# Patient Record
Sex: MALE | Race: WHITE | NOT HISPANIC OR LATINO | Employment: FULL TIME | URBAN - METROPOLITAN AREA
[De-identification: names, ages, dates, MRNs, and addresses within clinical notes are randomized per-mention and may not be internally consistent; named-entity substitution may affect disease eponyms.]

---

## 2018-08-01 ENCOUNTER — TELEPHONE (OUTPATIENT)
Dept: NEUROLOGY | Facility: CLINIC | Age: 28
End: 2018-08-01

## 2018-08-01 NOTE — TELEPHONE ENCOUNTER
----- Message from Hector Knott sent at 8/1/2018 11:26 AM CDT -----  Contact: Pt  .Type:  Patient Returning Call    Who Called: Gunjan  Who Left Message for Patient: Lance  Does the patient know what this is regarding?: patient isn't sure  Best Call Back Number: [phone] (home)   Additional Information:

## 2019-10-03 PROBLEM — R03.0 ELEVATED BP WITHOUT DIAGNOSIS OF HYPERTENSION: Status: ACTIVE | Noted: 2019-10-03

## 2019-10-03 PROBLEM — R19.7 DIARRHEA: Status: ACTIVE | Noted: 2019-10-03

## 2020-07-27 ENCOUNTER — TELEPHONE (OUTPATIENT)
Dept: NEUROLOGY | Facility: CLINIC | Age: 30
End: 2020-07-27

## 2020-07-27 NOTE — TELEPHONE ENCOUNTER
----- Message from Jonas Cortez sent at 7/27/2020  2:30 PM CDT -----  Type: Needs Medical Advice    Who Called:  Velma Butler (Mother)  Best Call Back Number: [phone]  Additional Information: Caller would like to discuss scheduling new patient appointment from referral. Please call to advise. Thanks!

## 2020-07-27 NOTE — TELEPHONE ENCOUNTER
Called patient's mom, notified her that the first available new patient appointment is in October. Requested to set appointment at this time, placed on wait list and also gave information for other Neurologists to call and schedule. Date/Time/Location confirmed. Request mom call back if they get sooner appointment elsewhere. Verbalized understanding and agreed.

## 2020-07-28 ENCOUNTER — TELEPHONE (OUTPATIENT)
Dept: NEUROLOGY | Facility: CLINIC | Age: 30
End: 2020-07-28

## 2020-07-28 NOTE — TELEPHONE ENCOUNTER
Called and spoke with mother. Informed he had our soonest appointment but added appointment to wait list with high priority. Also provided [place] number. Patient mother expressed understanding.

## 2020-07-28 NOTE — TELEPHONE ENCOUNTER
----- Message from Estrella Barbosa sent at 7/28/2020  3:57 PM CDT -----  Regarding: appt access and questions  Contact: mother  Type: Needs Medical Advice    Who Called:  Velma Mills Call Back Number: .    Additional Information: Requesting a call back from Nurse, regarding mother would like access for an appt for tomorrow or soon she also has some questions and needs advice ,please call back with advice

## 2020-10-21 ENCOUNTER — OFFICE VISIT (OUTPATIENT)
Dept: NEUROLOGY | Facility: CLINIC | Age: 30
End: 2020-10-21
Payer: MEDICAID

## 2020-10-21 VITALS
TEMPERATURE: 98 F | WEIGHT: 176.25 LBS | DIASTOLIC BLOOD PRESSURE: 85 MMHG | BODY MASS INDEX: 26.8 KG/M2 | HEART RATE: 81 BPM | RESPIRATION RATE: 18 BRPM | SYSTOLIC BLOOD PRESSURE: 137 MMHG

## 2020-10-21 DIAGNOSIS — Z98.890 HISTORY OF NASAL SEPTOPLASTY: ICD-10-CM

## 2020-10-21 DIAGNOSIS — R03.0 ELEVATED BP WITHOUT DIAGNOSIS OF HYPERTENSION: ICD-10-CM

## 2020-10-21 DIAGNOSIS — G43.719 INTRACTABLE CHRONIC MIGRAINE WITHOUT AURA AND WITHOUT STATUS MIGRAINOSUS: Primary | ICD-10-CM

## 2020-10-21 DIAGNOSIS — F19.11 HISTORY OF SUBSTANCE ABUSE: ICD-10-CM

## 2020-10-21 DIAGNOSIS — G89.29 CHRONIC NECK PAIN: ICD-10-CM

## 2020-10-21 DIAGNOSIS — M54.2 CHRONIC NECK PAIN: ICD-10-CM

## 2020-10-21 DIAGNOSIS — Z82.49 FAMILY HISTORY OF HYPERTENSION: ICD-10-CM

## 2020-10-21 PROCEDURE — 99204 OFFICE O/P NEW MOD 45 MIN: CPT | Mod: S$PBB,,, | Performed by: PSYCHIATRY & NEUROLOGY

## 2020-10-21 PROCEDURE — 99999 PR PBB SHADOW E&M-EST. PATIENT-LVL III: ICD-10-PCS | Mod: PBBFAC,,, | Performed by: PSYCHIATRY & NEUROLOGY

## 2020-10-21 PROCEDURE — 99999 PR PBB SHADOW E&M-EST. PATIENT-LVL III: CPT | Mod: PBBFAC,,, | Performed by: PSYCHIATRY & NEUROLOGY

## 2020-10-21 PROCEDURE — 99213 OFFICE O/P EST LOW 20 MIN: CPT | Mod: PBBFAC,PO | Performed by: PSYCHIATRY & NEUROLOGY

## 2020-10-21 PROCEDURE — 99204 PR OFFICE/OUTPT VISIT, NEW, LEVL IV, 45-59 MIN: ICD-10-PCS | Mod: S$PBB,,, | Performed by: PSYCHIATRY & NEUROLOGY

## 2020-10-21 NOTE — PROGRESS NOTES
Headache questionnaire    1. When did your Headaches start?    As long as I can remember      2. Where are your headaches located?   Temple/side mostly left      3. Your headache's characteristics:   Excruciating, Pressure, Throbbing, Pounding      4. How long does the headache last?   Minutes hours      5. How often does the headache occur?   daily, weekly and cluster      6. Are your headaches preceded or accompanied by other symptoms? yes   If yes, please describe.  Sinus pressure, neck pain       7. Does the headache awaken you at night? yes   If so, how often? Most nights when I am having headaches        8. Please cristina the word that best describes your headache's intensity:    severe      9. Using a scale of 1 through 10, with 0 = no pain and 10 = the worst pain:   What score is your headache now? 0   What score is your headache at its worst? 10   What score is your headache at its best? 0        10. Possible associated headache symptoms:  [x]  Sensitivity to light  [x] Dizziness  [x] Nasal or sinus pressure/ pain   [x] Sensitivity to noise  [] Vertigo  [x] Problems with concentration  [x] Sensitivity to smells  [] Ringing in ears  [x] Problems with memory    [x] Blurred vision  [x] Irritability  [x] Problems with task completion   [] Double vision  [x] Anger  [x]  Problems with relaxation  [x] Loss of appetite  [x] Anxiety  [x] Neck tightness, Neck pain  [x] Nausea   [x] Nasal congestion  [] Vomiting         11. Headache improving factors:  [] Sleep  [x] Heat  [x] Darkness  [x] Ice  [] Local pressure [] Menses (period)  [x] Massage   [] Medications:        12. Headache worsening factors:   [x] Fatigue [] Sneezing  [] Changes in Weather  [] Light [x] Bending Over [x] Stress  [] Noise [] Ovulation  [] Multiple Sclerosis Flare-Up  [x] Smells  [] Menses  [] Food   [] Coughing [x] Alcohol      13. Number of caffeinated drinks per day: 0      14. Number of diet drinks per day:0      15. Have you seen any other  [facility] Neurologists within the last 3 years?  no    Please Check any Medications or Procedures tried/failed for Headache    AED Neuromodulators  MAOIs  Ergot Alkaloids    Acetazolamide (Diamox) [] Phenelzine (Nardil) [] Dihydroergotamine (Migranal) []   Carbamazepine (Tegretol) [] Tranylcypromine (Parnate) [] Ergotamine (Ergomar) []   Gabapentin (Neurontin) [] Antihistamine/Serotonergic  Triptans    Lacosamide (Vimpat) [] Cyproheptadine (Periactin) [] Almotriptan (Axert) []   Lamotrigine (Lamictal) [] Antihypertensives  Eletriptan (Relpax) [x]   Levatiracetam (Keppra) [] Atenolol (Tenormin) [] Frovatriptan (Frova) []   Oxcarbazepine (Trileptal) [] Bisoprostol (Zebeta) [] Naratriptan (Amerge) []   Phenobarbital [] Candesartan (Atacand) [] Rizatriptan (Maxalt) [x]     Nebivolol (Bystolic)  Sumatriptan (Imitrex) [x]   Levetiracetam (Keppra)  Cardeilol (Coreg) [] Zolmitriptan (Zomig) [x]   Phenytoin (Dilantin) [] Diltiazem (Cardizem) [] Treximet    Pregabalin (Lyrica) [] Lisinopril (Prinivil, Zestril) [] Combo Abortives    Primidone (Mysoline) [] Metoprolol (Toprol) [] BC Powder []   Tiagabine (Gabatril) [] Nadolol (Corgard) [] Butalbital and Acetaminophen (Bupap) []   Topiramate (Topamax)  (Trokendi) [x] Nicardipine (Cardene) []     Vigabatrin (Sabril) [] Nimodipine (Nimotop) [] Butalbital, Acetaminophen, and caffeine (Fioricet) []   Valproic Acid (Depakote) (Divalproex Sodium) [x] Propranolol (Inderal) [x]     Zonisamide (Zonegran) [] Telmisartan (Micardis) [] Butalbital, Aspirin, and caffeine (Fiorinal) []   Benzodiazepines  Timolol (Blocadren) []     Alprazolam (Xanax) [] Verapamil (Calan, Verelan) [] Butalbital, Caffeine, Acetaminophen, and Codeine (Fioricet with Codeine) [x]   Diazepam (Valium) [] NSAIDs      Lorazepam (Ativan) [] Acetaminophen (Tylenol) []     Clonazepam (Klonopin) [] Acetylsalicylic Acid (Aspirin) [] Butalbital, Caffeine, Aspirin, and Codeine  (Fiorinal with Codeine) []   Antidepressants   Diclofenac (Cambia) []     Amitriptyline (Elavil) [] Ibuprofen (Motrin) [x]     Desipramine (Norpramin) [] Indomethacin (Indocin) [] Aspirin, Caffeine, and Acetaminophen (Excedrin) (Goodys) [x]   Doxepin (Sinequan) [] Ketoprofen (Orudis) []     Fluoxetine (Prozac) [] Ketorolac (Toradol) [] Acetaminophen, Dichloralphenazone, and Isometheptene (Midrin) []   Imipramine (Tofranil) [] Naproxen (Anaprox) (Aleve) [x]     Nortriptyline (Pamelor) [] Meclofenamic Acid (Meclomen) []     Venlafaxine (Effexor) [] Meloxicam (Mobic) [] Procedures    Desvenlafazine (Pristiq) [] Monoclonals  Greater occipital nerve block []   Duloxetine (Cymbalta) [] Eptinezumab [] Cervical, Thoracic, Lumbar radiofrequency ablation []   Trazadone [] Erenumab-aooe (Aimovig) [] Spenopalatine ganglion block []   Wellbutrin [] Galcanezumab (Emgality) [] Occipital neuro stimulation []   Protriptyline (Vivactil) [] Fremanazumab-vfrm (Ajovy)  Cervical, Thoracic, Lumbar, Caudal Epidural steroid injection [x]   Escitalopram (Lexapro) [] Other [] Sacroiliac joint steroid injection []   Celexa [] Memantine (Namenda) [] Transforaminal epidural steroid injection []     Botox [] Facet joint injections []     Baclofen (Lioresal) [] Cervical, Thoracic, Lumbar medial branch blocks []       Cefaly []       Gamma Core []       Iovera []       Transcranial Magnetic stimulation []

## 2020-10-21 NOTE — LETTER
October 22, 2020      Jerry Nvaarro MD  [address]  [facility] [address]           [facility]  [address]  [facility] [address]  Phone: [phone]  Fax: [phone]          Patient: Lance Andrade   MR Number: [id number]   YOB: 1990   Date of Visit: 10/21/2020       Dear Dr. Jerry Navarro:    Thank you for referring Lance Andrade to me for evaluation. Attached you will find relevant portions of my assessment and plan of care.    If you have questions, please do not hesitate to call me. I look forward to following Lance Andrade along with you.    Sincerely,    Debora Tapia MD    Enclosure  CC:  No Recipients    If you would like to receive this communication electronically, please contact [email or web address] or [phone] to request more information on [employer] access.    For providers and/or their staff who would like to refer a patient to [facility], please contact us through our one-stop-shop provider referral line, [facility], at [phone].    If you feel you have received this communication in error or would no longer like to receive these types of communications, please e-mail [email or web address]

## 2020-10-22 NOTE — PROGRESS NOTES
"Subjective:       Patient ID: Lance Andrade is a 30 y.o. male.    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 Positive FHX of migraine affecting his mother. The frequency of the attacks is quite variable, ranging from daily to periods of headache freedom lasting weeks. He reports the headaches "come in clusters." He was taking analgesics, naproxen, ibuprofen, goodie powders QID on a daily basis. His own reading made him aware of Medication Overuse Headache. He tapered himself off and gradually got better. He reports very severe pain in the process but he endured it and is now a lot better.He has tried a number of preventives including Topamax, Depakote, Propranolol. For acute treatment he has tried triptans, all associated with side effects, including treximet, imitrex, maxalt, relpax. Fioricet only partially effective. Per records, he has history of substance abuse, and was monitored for months in [id number].    Headache questionnaire    1. When did your Headaches start?    As long as I can remember      2. Where are your headaches located?   Temple/side mostly left      3. Your headache's characteristics:   Excruciating, Pressure, Throbbing, Pounding      4. How long does the headache last?   Minutes hours      5. How often does the headache occur?   daily, weekly and cluster      6. Are your headaches preceded or accompanied by other symptoms? yes   If yes, please describe.  Sinus pressure, neck pain       7. Does the headache awaken you at night? yes   If so, how often? Most nights when I am having headaches        8. Please cristina the word that best describes your headache's intensity:    severe      9. Using a scale of 1 through 10, with 0 = no pain and 10 = the worst pain:   What score is your headache now? 0   What score is your headache at its worst? 10   What " score is your headache at its best? 0        10. Possible associated headache symptoms:  [x]  Sensitivity to light  [x] Dizziness  [x] Nasal or sinus pressure/ pain   [x] Sensitivity to noise  [] Vertigo  [x] Problems with concentration  [x] Sensitivity to smells  [] Ringing in ears  [x] Problems with memory    [x] Blurred vision  [x] Irritability  [x] Problems with task completion   [] Double vision  [x] Anger  [x]  Problems with relaxation  [x] Loss of appetite  [x] Anxiety  [x] Neck tightness, Neck pain  [x] Nausea   [x] Nasal congestion  [] Vomiting         11. Headache improving factors:  [] Sleep  [x] Heat  [x] Darkness  [x] Ice  [] Local pressure [] Menses (period)  [x] Massage   [] Medications:        12. Headache worsening factors:   [x] Fatigue [] Sneezing  [] Changes in Weather  [] Light [x] Bending Over [x] Stress  [] Noise [] Ovulation  [] Multiple Sclerosis Flare-Up  [x] Smells  [] Menses  [] Food   [] Coughing [x] Alcohol      13. Number of caffeinated drinks per day: 0      14. Number of diet drinks per day:0      15. Have you seen any other [facility]rologists within the last 3 years?  no    Please Check any Medications or Procedures tried/failed for Headache    AED Neuromodulators  MAOIs  Ergot Alkaloids    Acetazolamide (Diamox) [] Phenelzine (Nardil) [] Dihydroergotamine (Migranal) []   Carbamazepine (Tegretol) [] Tranylcypromine (Parnate) [] Ergotamine (Ergomar) []   Gabapentin (Neurontin) [] Antihistamine/Serotonergic  Triptans    Lacosamide (Vimpat) [] Cyproheptadine (Periactin) [] Almotriptan (Axert) []   Lamotrigine (Lamictal) [] Antihypertensives  Eletriptan (Relpax) [x]   Levatiracetam (Keppra) [] Atenolol (Tenormin) [] Frovatriptan (Frova) []   Oxcarbazepine (Trileptal) [] Bisoprostol (Zebeta) [] Naratriptan (Amerge) []   Phenobarbital [] Candesartan (Atacand) [] Rizatriptan (Maxalt) [x]     Nebivolol (Bystolic)  Sumatriptan (Imitrex) [x]   Levetiracetam (Keppra)  Cardeilol (Coreg) []  Zolmitriptan (Zomig) [x]   Phenytoin (Dilantin) [] Diltiazem (Cardizem) [] Treximet    Pregabalin (Lyrica) [] Lisinopril (Prinivil, Zestril) [] Combo Abortives    Primidone (Mysoline) [] Metoprolol (Toprol) [] BC Powder []   Tiagabine (Gabatril) [] Nadolol (Corgard) [] Butalbital and Acetaminophen (Bupap) []   Topiramate (Topamax)  (Trokendi) [x] Nicardipine (Cardene) []     Vigabatrin (Sabril) [] Nimodipine (Nimotop) [] Butalbital, Acetaminophen, and caffeine (Fioricet) []   Valproic Acid (Depakote) (Divalproex Sodium) [x] Propranolol (Inderal) [x]     Zonisamide (Zonegran) [] Telmisartan (Micardis) [] Butalbital, Aspirin, and caffeine (Fiorinal) []   Benzodiazepines  Timolol (Blocadren) []     Alprazolam (Xanax) [] Verapamil (Calan, Verelan) [] Butalbital, Caffeine, Acetaminophen, and Codeine (Fioricet with Codeine) [x]   Diazepam (Valium) [] NSAIDs      Lorazepam (Ativan) [] Acetaminophen (Tylenol) []     Clonazepam (Klonopin) [] Acetylsalicylic Acid (Aspirin) [] Butalbital, Caffeine, Aspirin, and Codeine  (Fiorinal with Codeine) []   Antidepressants  Diclofenac (Cambia) []     Amitriptyline (Elavil) [] Ibuprofen (Motrin) [x]     Desipramine (Norpramin) [] Indomethacin (Indocin) [] Aspirin, Caffeine, and Acetaminophen (Excedrin) (Goodys) [x]   Doxepin (Sinequan) [] Ketoprofen (Orudis) []     Fluoxetine (Prozac) [] Ketorolac (Toradol) [] Acetaminophen, Dichloralphenazone, and Isometheptene (Midrin) []   Imipramine (Tofranil) [] Naproxen (Anaprox) (Aleve) [x]     Nortriptyline (Pamelor) [] Meclofenamic Acid (Meclomen) []     Venlafaxine (Effexor) [] Meloxicam (Mobic) [] Procedures    Desvenlafazine (Pristiq) [] Monoclonals  Greater occipital nerve block []   Duloxetine (Cymbalta) [] Eptinezumab [] Cervical, Thoracic, Lumbar radiofrequency ablation []   Trazadone [] Erenumab-aooe (Aimovig) [] Spenopalatine ganglion block []   Wellbutrin [] Galcanezumab (Emgality) [] Occipital neuro stimulation []   Protriptyline  (Vivactil) [] Fremanazumab-vfrm (Ajovy)  Cervical, Thoracic, Lumbar, Caudal Epidural steroid injection [x]   Escitalopram (Lexapro) [] Other [] Sacroiliac joint steroid injection []   Celexa [] Memantine (Namenda) [] Transforaminal epidural steroid injection []     Botox [] Facet joint injections []     Baclofen (Lioresal) [] Cervical, Thoracic, Lumbar medial branch blocks []       Cefaly []       Gamma Core []       Iovera []       Transcranial Magnetic stimulation []                            Review of Systems   Constitutional: Negative for activity change, appetite change, chills, fatigue and fever.   HENT: Negative for congestion, dental problem, ear pain, hearing loss, sinus pressure, tinnitus, trouble swallowing and voice change.    Eyes: Negative for photophobia, pain and visual disturbance.   Respiratory: Negative for cough, chest tightness and shortness of breath.    Cardiovascular: Negative for chest pain, palpitations and leg swelling.   Gastrointestinal: Negative for abdominal pain, blood in stool, constipation, diarrhea, nausea and vomiting.   Endocrine: Negative for cold intolerance and heat intolerance.   Genitourinary: Negative for difficulty urinating, dysuria and urgency.   Musculoskeletal: Positive for neck pain. Negative for arthralgias, back pain, gait problem, myalgias and neck stiffness.   Skin: Negative for color change, pallor and rash.   Neurological: Positive for headaches. Negative for dizziness, tremors, seizures, syncope, facial asymmetry, speech difficulty, weakness, light-headedness and numbness.   Hematological: Negative for adenopathy. Does not bruise/bleed easily.   Psychiatric/Behavioral: Negative for agitation, behavioral problems, confusion, decreased concentration, self-injury, sleep disturbance and suicidal ideas. The patient is not nervous/anxious.                Past Medical History:   Diagnosis Date    a Congenital pulmonary valve stenosis     Was Cleared By Pediatric  Cardiology; Did Not Require Any Corrective Sx    b Elevated BP W/O DX Of HTN     1/24/19 RXd A Low Salt Diet And Recommended He Stop OTC Creatine Muscle Building Supplements    b Family H/O Hypertension     Both Of Parents    k Erectile Dysfunction     10/12/15 Testosterone = Normal    k Left varicocele S/P Ligation     k Urinary Fequency     7/16/20 Ordered A U/A And UCx    l Chronic Recurrent Neck Pain     He Sees A Chiropracter For This; 09/2018 C-Spine MRI = Was Reportedly Unremarkable    m Migraines     7/16/20 And 7/26/18 Referred To Dr. Megan Nunes, And RXd Imitrex PRN    n Attention Deficit Disorder ###    7/10/17 Increased His 30 Mg Adderall Bid To Adderall 30 Mg Bid    n Hx Of Opiod Abuse In 2012 ###    Had A  And Was In A Tx Program Then    n Therapeutic Drug Monitoring     o Allergic rhinosinusitis     o H/O Nasal Septoplasty In 2013     This Helped With His Migraines    o Right Lower Eyelid Lesions     1/18/17 Referred To Dr. Andrews Dodd; HSV ??    q BLE Varicose Veins     Wellness Visit 7/16/2020      Past Surgical History:   Procedure Laterality Date    DENTAL SURGERY      SINUS SURGERY      TONSILLECTOMY      WISDOM TOOTH EXTRACTION      WRIST SURGERY      left     Family History   Problem Relation Age of Onset    Allergic rhinitis Father     Angioedema Neg Hx     Asthma Neg Hx     Atopy Neg Hx     Immunodeficiency Neg Hx     Eczema Neg Hx     Urticaria Neg Hx      Social History     Socioeconomic History    Marital status: Single     Spouse name: Not on file    Number of children: Not on file    Years of education: Not on file    Highest education level: Not on file   Occupational History    Occupation: Works restaurant(, etc)     Employer: [employer]   Social Needs    Financial resource strain: Not on file    Food insecurity     Worry: Not on file     Inability: Not on file    Transportation needs     Medical: Not on file      Non-medical: Not on file   Tobacco Use    Smoking status: Current Some Day Smoker     Types: Vaping with nicotine    Smokeless tobacco: Former User    Tobacco comment: chews tobacco   Substance and Sexual Activity    Alcohol use: Yes     Comment: soc    Drug use: No     Comment: past Hx abuse, denies any active use and in court ordered monitoring program, does not want to have any narcotic postop    Sexual activity: Yes     Partners: Female   Lifestyle    Physical activity     Days per week: Not on file     Minutes per session: Not on file    Stress: To some extent   Relationships    Social connections     Talks on phone: Not on file     Gets together: Not on file     Attends Zoroastrian service: Not on file     Active member of club or organization: Not on file     Attends meetings of clubs or organizations: Not on file     Relationship status: Not on file   Other Topics Concern    Not on file   Social History Narrative    Not on file     Review of patient's allergies indicates:   Allergen Reactions    Penicillins Rash       Current Outpatient Medications:     [START ON 12/19/2020] dextroamphetamine-amphetamine 30 mg Tab, Take 1 tablet (30 mg total) by mouth 2 (two) times daily. For diagnosis code F90.9, Disp: 60 tablet, Rfl: 0    fluticasone propionate (FLONASE) 50 mcg/actuation nasal spray, 2 sprays (100 mcg total) by Each Nostril route daily as needed for Rhinitis., Disp: 54 g, Rfl: 3    montelukast (SINGULAIR) 10 mg tablet, TK 1 T PO QD, Disp: , Rfl:     sildenafil (REVATIO) 20 mg Tab, Take 2-5 tablets PO once as needed, Disp: 30 tablet, Rfl: 3    sumatriptan (IMITREX) 50 MG tablet, Take 1 tablet (50 mg total) by mouth daily as needed for Migraine., Disp: 10 tablet, Rfl: 3    tiZANidine (ZANAFLEX) 2 MG tablet, TAKE 1 TABLET BY MOUTH EVERY NIGHT AS NEEDED, Disp: 30 tablet, Rfl: 3      Objective:      Vitals:    10/21/20 1020   BP: 137/85   Pulse: 81   Resp: 18   Temp: 98.3 °F (36.8 °C)     Body  mass index is 26.8 kg/m².    Physical Exam    Constitutional:     He appears well-developed and well-nourished. He is well groomed  HENT:    Head: Atraumatic, oral and nasal mucosa intact  Eyes: Conjunctivae and EOM are normal. Pupils are equal, round, and reactive to light OU  Neck: Neck supple. No thyromegaly present  Cardiovascular: Normal rate and normal heart sounds  No murmur heard  Pulmonary/Chest: Effort normal and breath sounds normal  Musculoskeletal: Normal range of motion. No joint stiffness. No vertebral point tenderness  Skin: Skin is warm and dry  Psychiatric: Normal mood and affect     Neuro exam:    Mental status:  Awake, attentive, Alert, oriented to self, place, year and month  Language function is intact    Cranial Nerves:  Smell was not formally evaluated  Cranial Nerves II - XII: intact  Pursuits were smooth, normal saccades, no nystagmus OU  Funduscopic exam - disc were flat and pink, no exudates or hemorrhages OU  Motor - facial movement was symmetrical and normal     Palate moved well and was symmetrical with normal palatal and oral sensation  Tongue movements were full    Coordination:     Rapid alternating movements and rapid finger tapping - normal bilaterally  Finger to nose - normal and symmetric bilaterally   Heel to shin test - normal and symmetric bilaterally   Arm roll - smooth and symmetric   No intentional or positional tremor.     Motor:  Normal muscle bulk and symmetry. No fasciculations were noted    No pronator drift  Strength of shoulder abduction, elbow flexion, wrist extension, elbow extension, finger flexion and hand intrinsics were 5/5 bilaterally    Strength of hip flexion, knee extension, knee flexion, ankle dorsiflexion, ankle plantarflexion were 5/5 bilaterally     Reflexes:  Tendon reflexes were 2 + at biceps, triceps, brachioradialis, patellar, and Achilles bilaterally  On plantar stimulation toes were down going bilaterally  No clonus was noted     Sensory: Intact  to primary modalities in all extremities.    Gait: Romberg absent. Normal gait. Great tandem    Review of Data:  Lab Results   Component Value Date     07/22/2020    K 4.3 07/22/2020     07/22/2020    CO2 28 07/22/2020    BUN 14 07/22/2020    CREATININE 0.85 07/22/2020    GLU 89 07/22/2020    AST 40 07/22/2020    ALT 47 07/22/2020    ALBUMIN 5.2 07/22/2020    PROT 7.6 07/22/2020    BILITOT 1.2 07/22/2020    CHOL 175 07/22/2020    HDL 66 07/22/2020    LDLCALC 99.6 07/22/2020    TRIG 47 07/22/2020       Lab Results   Component Value Date    WBC 9.31 07/22/2020    HGB 16.0 07/22/2020    HCT 48.2 07/22/2020    MCV 87 07/22/2020     07/22/2020       Lab Results   Component Value Date    TSH 0.406 07/22/2020     MIDAS SCORE  11 (moderate disability)        Assessment and Plan   The patient meets criteria for fluctuating low frequency, high frequency and chronic migraines. Given his borderline high blood pressure, I will start prevention with Verapamil 120 mg daily.  For acute attacks, since he is sensitive to the tripatns, I have selected Nurtec ODT 75 mg daily prn, #8 per month  Cervical pain addressed by chiropractor. Consider sending to PT  Chronic rhinitis on Singulair.  Medication overuse headache, now off overuse for 3 months  I have discussed the side effects of the medications prescribed and the patient acknowledges understanding  Counseling:  More than 50% of the 45 minute encounter was spent face to face counseling the patient regarding current status and future plan of care as well as side of the medications. All questions were answered to patient's satisfaction          River Tapia M.D  Medical Director, Headache and Facial Pain  [facility]

## 2021-01-11 ENCOUNTER — TELEPHONE (OUTPATIENT)
Dept: NEUROLOGY | Facility: CLINIC | Age: 31
End: 2021-01-11

## 2021-05-06 ENCOUNTER — PATIENT MESSAGE (OUTPATIENT)
Dept: RESEARCH | Facility: HOSPITAL | Age: 31
End: 2021-05-06

## 2022-05-24 ENCOUNTER — TELEPHONE (OUTPATIENT)
Dept: NEUROLOGY | Facility: CLINIC | Age: 32
End: 2022-05-24
Payer: MEDICAID

## 2022-05-24 NOTE — TELEPHONE ENCOUNTER
----- Message from Erlin Garg sent at 5/24/2022  2:12 PM CDT -----  Regarding: sooner appt  Type:  Sooner Appointment Request    Caller is requesting a sooner appointment.      Name of Caller:  Lance    When is the first available appointment?  8/20/22    Symptoms:  headache ongoing for 30 days    Best Call Back Number:  [phone]      Additional Information:  pt is EP of Dr Tapia from 2020. Please call to discuss options. Note: at last visit was given samples of NURTEC ODT that helped. If cant be seen for some time can pt get Rx for NURTEC ODT.

## 2022-06-10 ENCOUNTER — OFFICE VISIT (OUTPATIENT)
Dept: NEUROLOGY | Facility: CLINIC | Age: 32
End: 2022-06-10
Payer: MEDICAID

## 2022-06-10 VITALS
WEIGHT: 174.06 LBS | HEIGHT: 68 IN | SYSTOLIC BLOOD PRESSURE: 133 MMHG | HEART RATE: 60 BPM | BODY MASS INDEX: 26.38 KG/M2 | RESPIRATION RATE: 17 BRPM | DIASTOLIC BLOOD PRESSURE: 80 MMHG

## 2022-06-10 DIAGNOSIS — Z78.9 CAFFEINE USE: ICD-10-CM

## 2022-06-10 DIAGNOSIS — G43.719 INTRACTABLE CHRONIC MIGRAINE WITHOUT AURA AND WITHOUT STATUS MIGRAINOSUS: Primary | ICD-10-CM

## 2022-06-10 DIAGNOSIS — G47.9 TROUBLE IN SLEEPING: ICD-10-CM

## 2022-06-10 DIAGNOSIS — G44.40 REBOUND HEADACHE: ICD-10-CM

## 2022-06-10 PROCEDURE — 3075F SYST BP GE 130 - 139MM HG: CPT | Mod: CPTII,,, | Performed by: PHYSICIAN ASSISTANT

## 2022-06-10 PROCEDURE — 99215 OFFICE O/P EST HI 40 MIN: CPT | Mod: S$PBB,,, | Performed by: PHYSICIAN ASSISTANT

## 2022-06-10 PROCEDURE — 3008F BODY MASS INDEX DOCD: CPT | Mod: CPTII,,, | Performed by: PHYSICIAN ASSISTANT

## 2022-06-10 PROCEDURE — 1159F PR MEDICATION LIST DOCUMENTED IN MEDICAL RECORD: ICD-10-PCS | Mod: CPTII,,, | Performed by: PHYSICIAN ASSISTANT

## 2022-06-10 PROCEDURE — 1160F RVW MEDS BY RX/DR IN RCRD: CPT | Mod: CPTII,,, | Performed by: PHYSICIAN ASSISTANT

## 2022-06-10 PROCEDURE — 3079F DIAST BP 80-89 MM HG: CPT | Mod: CPTII,,, | Performed by: PHYSICIAN ASSISTANT

## 2022-06-10 PROCEDURE — 99999 PR PBB SHADOW E&M-EST. PATIENT-LVL IV: CPT | Mod: PBBFAC,,, | Performed by: PHYSICIAN ASSISTANT

## 2022-06-10 PROCEDURE — 3079F PR MOST RECENT DIASTOLIC BLOOD PRESSURE 80-89 MM HG: ICD-10-PCS | Mod: CPTII,,, | Performed by: PHYSICIAN ASSISTANT

## 2022-06-10 PROCEDURE — 3075F PR MOST RECENT SYSTOLIC BLOOD PRESS GE 130-139MM HG: ICD-10-PCS | Mod: CPTII,,, | Performed by: PHYSICIAN ASSISTANT

## 2022-06-10 PROCEDURE — 99215 PR OFFICE/OUTPT VISIT, EST, LEVL V, 40-54 MIN: ICD-10-PCS | Mod: S$PBB,,, | Performed by: PHYSICIAN ASSISTANT

## 2022-06-10 PROCEDURE — 1160F PR REVIEW ALL MEDS BY PRESCRIBER/CLIN PHARMACIST DOCUMENTED: ICD-10-PCS | Mod: CPTII,,, | Performed by: PHYSICIAN ASSISTANT

## 2022-06-10 PROCEDURE — 1159F MED LIST DOCD IN RCRD: CPT | Mod: CPTII,,, | Performed by: PHYSICIAN ASSISTANT

## 2022-06-10 PROCEDURE — 99999 PR PBB SHADOW E&M-EST. PATIENT-LVL IV: ICD-10-PCS | Mod: PBBFAC,,, | Performed by: PHYSICIAN ASSISTANT

## 2022-06-10 PROCEDURE — 3008F PR BODY MASS INDEX (BMI) DOCUMENTED: ICD-10-PCS | Mod: CPTII,,, | Performed by: PHYSICIAN ASSISTANT

## 2022-06-10 PROCEDURE — 99214 OFFICE O/P EST MOD 30 MIN: CPT | Mod: PBBFAC,PO | Performed by: PHYSICIAN ASSISTANT

## 2022-06-10 RX ORDER — RIZATRIPTAN BENZOATE 10 MG/1
TABLET, ORALLY DISINTEGRATING ORAL
Qty: 8 TABLET | Refills: 4 | Status: SHIPPED | OUTPATIENT
Start: 2022-06-10 | End: 2022-06-20

## 2022-06-10 RX ORDER — SODIUM FLUORIDE 6 MG/ML
PASTE, DENTIFRICE DENTAL
COMMUNITY
Start: 2022-05-10

## 2022-06-10 RX ORDER — PREDNISONE 20 MG/1
TABLET ORAL
Qty: 12 TABLET | Refills: 0 | Status: SHIPPED | OUTPATIENT
Start: 2022-06-10 | End: 2022-07-11 | Stop reason: ALTCHOICE

## 2022-06-10 NOTE — PROGRESS NOTES
"  [facility]  [address] [facility] [address]  [place] [address]  Phone:[phone]  Fax: [phone]  Follow up visit    Patient Name: Lance Andrade  : 1990  MRN:  [id number]  Today: 6/10/2022   LOV: 10/21/2020 dawit Tapia   chief complaint: Headache    PCP: Jerry Navarro MD.    Assessment:   Lance Andrade is a 31 y.o. right handed male with a PMHx of: substance abuse (per chart review, including opiate), migraines, ADHD and neck pain  whom presents solo to reestablish care for HA. Was last seen by Dr. Tapia, 10/2020. HA appear to be migrainous and likely worsened by medication overuse, trouble in sleep and caffeine use.         Review:    ICD-10-CM ICD-9-CM   1. Intractable chronic migraine without aura and without status migrainosus  G43.719 346.71   2. Rebound headache  G44.40 339.3   3. Caffeine use  Z78.9 V49.89   4. Trouble in sleeping  G47.9 780.50         Plan:   Discussed realistic goals of care with patient at length. Discussed medication options, need for lifestyle adjustment. Discussed treatment will take time. Goal will be to reduce frequency/intensity/quantity of HA, not to be completely HA free. Gave copy of AHS triggers for migraine informational sheet, and discussed clinic's non narcotic policy re: HA. Patient voiced understanding and agreement.                  -will have patient work on lifestyle           -if HA change in quality/nature, will get updated imaging study     For HA Prevention:  None for now, may be a candidate for cGRP   Consider the cefaly device, www.cefaly.us, please research, this is TENs unit designed in [place] and was FDA approved in the early s for migraines.     For HA :  Stop imitrex  Try maxalt, discussed adv effects/dosing, he agreed           -do not take with any of imitrex "left over"  Offered nurtec but after discussion will stick with maxalt    To break up Headaches:  Course of prednisone, " discussed adv effects/dosing, he will start tomorrow     -in meanwhile, no OTC medicines more than 3 days use in week       -slowly start cut back on caffeine intake, at same time, increase hydration with water     Other:  N/A           All test results as well as any necessary instructions were reviewed and discussed with patient.    Review:  Orders Placed This Encounter    predniSONE (DELTASONE) 20 MG tablet    rizatriptan (MAXALT-MLT) 10 MG disintegrating tablet         Patient to return to PCP/other specialists for all other problems  Patient to continue on all medications as Rx'd   A detailed AVS was provided to the patient with patient readback   RTO- 3-4 weeks to check in   The patient indicates understanding of these issues and agrees to the plan.    HPI:   Lance Andrade is a 31 y.o.right handed, male with a PMHx of: substance abuse (per chart review, including opiate), migraines, ADHD and neck pain  whom presents solo to reestablish care for HA. Was last seen by Dr. Tapia, 10/2020.     At last visit: started on verapamil and nurtec.   Self stopped verapamil  Felt nurtec helped-had samples of this (years ago)    HA came back 2-3 months ago-no trigger identified   Location: left hemicrania  Severity: moderate-severe  Duration:2-3 hours as he takes something for it (OTC daily), or if too bad will take imitrex or muscle relaxants. The imitrex at 50 mg isn't very effective. Will also take large amounts of caffeine to break up the HA   Frequency:daily (30 HD/30)    Associated factors (bolded positive) WITH HA ( or migraine): Nausea, vomiting, photophobia, phonophobia, tinnitus, scalp pain, vision loss, diplopia, scintillations, eye pain, jaw pain, weakness?    Tried:caffeine, tylenol, imitrex, muscle relaxers   Triggers (in bold): neck pain,  stress, lack of sleep, too much caffeine, too little caffeine, weather change, seasonal change, strong odours, bright lights, sunlight  Last HA: 2 night ago  "  Positives in bold: Hx of Kidney Stones, asthma, GI bleed, osteoporosis, CAD/MI, CVA/TIA, DM  <---denies  Imaging on file: none of brain in past 3 years at [facility]   Therapies tried in past: (failures to be marked, if known---why did it fail?)  Topamax  Depakote  Propranolol  treximet  imitrex  maxalt  relpax.   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 Positive FHX of migraine affecting his mother. The frequency of the attacks is quite variable, ranging from daily to periods of headache freedom lasting weeks. He reports the headaches "come in clusters." He was taking analgesics, naproxen, ibuprofen, goodie powders QID on a daily basis. His own reading made him aware of Medication Overuse Headache. He tapered himself off and gradually got better. He reports very severe pain in the process but he endured it and is now a lot better.He has tried a number of preventives including Topamax, Depakote, Propranolol. For acute treatment he has tried triptans, all associated with side effects, including treximet, imitrex, maxalt, relpax. Fioricet only partially effective. Per records, he has history of substance abuse, and was monitored for months in [id number].    Headache questionnaire     1. When did your Headaches start?               As long as I can remember        2. Where are your headaches located?              Temple/side mostly left        3. Your headache's characteristics:              Excruciating, Pressure, Throbbing, Pounding        4. How long does the headache last?              Minutes hours        5. How often does the headache occur?              daily, weekly and cluster        6. Are your headaches preceded or accompanied by other symptoms? yes              If yes, please describe.  Sinus pressure, neck pain         7. Does " the headache awaken you at night? yes              If so, how often? Most nights when I am having headaches           8. Please cristina the word that best describes your headache's intensity:               severe        9. Using a scale of 1 through 10, with 0 = no pain and 10 = the worst pain:              What score is your headache now? 0              What score is your headache at its worst? 10              What score is your headache at its best? 0           10. Possible associated headache symptoms:  [x]?  Sensitivity to light              [x]? Dizziness                [x]? Nasal or sinus pressure/ pain          [x]? Sensitivity to noise             []? Vertigo                    [x]? Problems with concentration  [x]? Sensitivity to smells           []? Ringing in ears       [x]? Problems with memory                    [x]? Blurred vision                     [x]? Irritability                 [x]? Problems with task completion   []? Double vision                     [x]? Anger                      [x]?  Problems with relaxation  [x]? Loss of appetite                  [x]? Anxiety                   [x]? Neck tightness, Neck pain  [x]? Nausea                               [x]? Nasal congestion  []? Vomiting                                     11. Headache improving factors:  []? Sleep                      [x]? Heat  [x]? Darkness                [x]? Ice  []? Local pressure       []? Menses (period)  [x]? Massage                 []? Medications:          12. Headache worsening factors:   [x]? Fatigue       []? Sneezing                []? Changes in Weather  []? Light           [x]? Bending Over         [x]? Stress  []? Noise          []? Ovulation                []? Multiple Sclerosis Flare-Up  [x]? Smells        []? Menses                   []? Food   []? Coughing    [x]? Alcohol        13. Number of caffeinated drinks per day: 0        14. Number of diet drinks per day:0        15. Have you seen any other [facility]ists within the last 3 years?  no     Please Check any Medications or Procedures tried/failed for Headache     AED Neuromodulators   MAOIs   Ergot Alkaloids     Acetazolamide (Diamox) []?  Phenelzine (Nardil) []?  Dihydroergotamine (Migranal) []?    Carbamazepine (Tegretol) []?  Tranylcypromine (Parnate) []?  Ergotamine (Ergomar) []?    Gabapentin (Neurontin) [x]?  Antihistamine/Serotonergic   Triptans     Lacosamide (Vimpat) []?  Cyproheptadine (Periactin) []?  Almotriptan (Axert) []?    Lamotrigine (Lamictal) []?  Antihypertensives   Eletriptan (Relpax) [x]?    Levatiracetam (Keppra) []?  Atenolol (Tenormin) []?  Frovatriptan (Frova) []?    Oxcarbazepine (Trileptal) []?  Bisoprostol (Zebeta) []?  Naratriptan (Amerge) []?    Phenobarbital []?  Candesartan (Atacand) []?  Rizatriptan (Maxalt) [x]?        Nebivolol (Bystolic)   Sumatriptan (Imitrex) [x]?    Levetiracetam (Keppra)   Cardeilol (Coreg) []?  Zolmitriptan (Zomig) [x]?    Phenytoin (Dilantin) []?  Diltiazem (Cardizem) []?  Treximet     Pregabalin (Lyrica) []?  Lisinopril (Prinivil, Zestril) []?  Combo Abortives     Primidone (Mysoline) []?  Metoprolol (Toprol) []?  BC Powder []?    Tiagabine (Gabatril) []?  Nadolol (Corgard) []?  Butalbital and Acetaminophen (Bupap) []?    Topiramate (Topamax)  (Trokendi) [x]?  Nicardipine (Cardene) []?      Vigabatrin (Sabril) []?  Nimodipine (Nimotop) []?  Butalbital, Acetaminophen, and caffeine (Fioricet) []?    Valproic Acid (Depakote) (Divalproex Sodium) [x]?  Propranolol (Inderal) [x]?      Zonisamide (Zonegran) []?  Telmisartan (Micardis) []?  Butalbital, Aspirin, and caffeine (Fiorinal) []?    Benzodiazepines   Timolol (Blocadren) []?      Alprazolam (Xanax) []?  Verapamil (Calan, Verelan) []?  Butalbital, Caffeine, Acetaminophen, and Codeine (Fioricet with Codeine) [x]?    Diazepam (Valium) []?  NSAIDs       Lorazepam (Ativan) []?  Acetaminophen (Tylenol) []?      Clonazepam (Klonopin) []?  Acetylsalicylic  "Acid (Aspirin) []?  Butalbital, Caffeine, Aspirin, and Codeine  (Fiorinal with Codeine) []?    Antidepressants   Diclofenac (Cambia) []?      Amitriptyline (Elavil) []?  Ibuprofen (Motrin) [x]?      Desipramine (Norpramin) []?  Indomethacin (Indocin) []?  Aspirin, Caffeine, and Acetaminophen (Excedrin) (Goodys) [x]?    Doxepin (Sinequan) []?  Ketoprofen (Orudis) []?      Fluoxetine (Prozac) []?  Ketorolac (Toradol) []?  Acetaminophen, Dichloralphenazone, and Isometheptene (Midrin) []?    Imipramine (Tofranil) []?  Naproxen (Anaprox) (Aleve) [x]?      Nortriptyline (Pamelor) []?  Meclofenamic Acid (Meclomen) []?      Venlafaxine (Effexor) []?  Meloxicam (Mobic) []?  Procedures     Desvenlafazine (Pristiq) []?  Monoclonals   Greater occipital nerve block []?    Duloxetine (Cymbalta) []?  Eptinezumab []?  Cervical, Thoracic, Lumbar radiofrequency ablation []?    Trazadone []?  Erenumab-aooe (Aimovig) []?  Spenopalatine ganglion block []?    Wellbutrin []?  Galcanezumab (Emgality) []?  Occipital neuro stimulation []?    Protriptyline (Vivactil) []?  Fremanazumab-vfrm (Ajovy)   Cervical, Thoracic, Lumbar, Caudal Epidural steroid injection [x]?    Escitalopram (Lexapro) []?  Other []?  Sacroiliac joint steroid injection []?    Celexa []?  Memantine (Namenda) []?  Transforaminal epidural steroid injection []?        Botox []?  Facet joint injections []?        Baclofen (Lioresal) []?  Cervical, Thoracic, Lumbar medial branch blocks []?            Cefaly []?            Gamma Core []?            Iovera []?            Transcranial Magnetic stimulation []?                                   "  Medication Reconciliation:   Current Outpatient Medications   Medication Sig Dispense Refill    dextroamphetamine-amphetamine 30 mg Tab Take 1 tablet (30 mg total) by mouth 2 (two) times daily. For diagnosis code F90.9 60 tablet 0    montelukast (SINGULAIR) 10 mg tablet 1 tablet po QHS 90 tablet 3    sildenafiL (VIAGRA) 100 MG tablet " Take 1 tablet (100 mg total) by mouth daily as needed for Erectile Dysfunction. 90 tablet 1    sumatriptan (IMITREX) 50 MG tablet TAKE 1 TABLET(50 MG) BY MOUTH DAILY AS NEEDED FOR MIGRAINE 10 tablet 11     No current facility-administered medications for this visit.     Review of patient's allergies indicates:   Allergen Reactions    Penicillins Rash       PMHx:  Past Medical History:   Diagnosis Date    a Congenital pulmonary valve stenosis     Was Cleared By Pediatric Cardiology; Did Not Require Any Corrective Sx    b Elevated BP W/O DX Of HTN     1/24/19 RXd A Low Salt Diet And Recommended He Stop OTC Creatine Muscle Building Supplements    b Family H/O Hypertension     Both Of Parents    k Erectile Dysfunction     10/12/15 Testosterone = Normal    k Left varicocele S/P Ligation     k Urinary Fequency     7/16/20 Ordered A U/A And UCx    l Chronic Recurrent Neck Pain     He Sees A Chiropracter For This; 09/2018 C-Spine MRI = Was Reportedly Unremarkable    m Migraines     7/16/20 And 7/26/18 Referred To Dr. Megan Nunes, And RXd Imitrex PRN    n Attention Deficit Disorder ###    7/10/17 Increased His 30 Mg Adderall Bid To Adderall 30 Mg Bid    n Hx Of Opiod Abuse In 2012 ###    Had A  And Was In A Tx Program Then    n Therapeutic Drug Monitoring     o Allergic rhinosinusitis     o H/O Nasal Septoplasty In 2013     This Helped With His Migraines    o Right Lower Eyelid Lesions     1/18/17 Referred To Dr. Andrews Dodd; HSV ??   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  "tobacco, no etoh, 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, RN   Social History     Socioeconomic History    Marital status: Single   Occupational History    Occupation: Works restaurant(, etc)     Employer: [employer]   Tobacco Use    Smoking status: Current Some Day Smoker     Types: Vaping with nicotine    Smokeless tobacco: Former User    Tobacco comment: chews tobacco   Substance and Sexual Activity    Alcohol use: Yes     Comment: soc    Drug use: No     Comment: past Hx abuse, denies any active use and in court ordered monitoring program, does not want to have any narcotic postop    Sexual activity: Yes     Partners: Female           Labs:   Reviewed in chart     Imaging:   Reviewed in chart       Other testing:  Reviewed in chart     Note: I have independently reviewed any/all imaging/labs/tests and agree with the report (s) as documented.  Any discrepancies will be as noted/demarcated by free text.  ANN DELGADO 6/10/2022                     ROS:   Review Of Systems (questions asked, positive or additions in BOLD)  Gen: Weight change, fatigue/malaise, pyrexia   HEENT: Tinnitus, headache,  blurred vision, eye pain, diplopia, photophobia,  nose bleeds, congestion, sore throat, jaw pain, scalp pain, neck stiffness   Card: Palpitations, CP   Pulm: SOB, cough   Vas: Easy bruising, easy bleeding   GI: N/V/D/C, incontinence, hematemesis, hematochezia    : incontinence, hematuria   Endocrine: Temp intolerance, polyuria, polydipsia   M/S: Neck pain, myalgia, back pain, joint pain, falls    Neuro: PER HPI   PSY: Memory loss, confusion, depression, anxiety, trouble in sleep, hallucinations          EXAM:   /80 (BP Location: Right arm, Patient Position: Sitting, BP Method: Medium (Automatic))   Pulse 60   Resp 17   Ht 5' 8" (1.727 m)   Wt 78.9 kg (174 lb 0.9 oz)   BMI 26.46 kg/m²    GEN:  NAD, sits with arms folded during much of visit  HEENT: NC/AT, Frontalis was NTTP, temporalis was NTTP,  nares " patent, dentition appropriate, neck supple, trachea midline, Occiput mildly TTP and trapezius TTP     EXTREM:   no edema present.  Calluses on bilat palmar surfaces of hands   NEURO:  Mental Status:  Awake, alert and appropriately oriented to time, place, and person.  Normal attention and concentration.  Speech is fluent and appropriate language function (I.e., comprehension)    Cranial Nerves:     Extraocular movements are intact and without nystagmus.  Visual fields are full to confrontation testing. Colour vision change not found.  Facial movement is symmetric.  Facial sensation is intact.  Hearing is normal. Uvula in midline. DROM of neck in all (6) directions, shoulder shrug symmetrical. Tongue in midline without fasiculation.     Motor:  RUE:appropriate against gravity and medium force as tested 5/5              LUE: appropriate against gravity and medium force as tested 5/5              RLE:appropriate against gravity and medium force as tested 5/5              LLE: appropriate against gravity and medium force as tested 5/5  Tremor/pronator drift not apparent.    finger extension strength was symmetrical     Sensory:  RUE  intact light touch, proprioception and temperature  LUE intact light touch, proprioception and temperature    RLE intact light touch  LLE intact light touch      DTR's:                                            R              L                  Knee jerk 2+ 2+      Coordination:  FTN-WNL.     Gait and Stance: Normal manner of stance and gait function testing. Romberg was negative         This document has been electronically signed by Mr. Syed Stevens MPA, PA-C on 6/10/2022, I have personally typed this message using the EMR.       Dr Willie MD  was available during today's visit.     Personal Protective Equipment:    Personal Protective Equipment was used during this encounter including: KN95 and non latex gloves.          CC: Jerry Navarro MD

## 2022-06-10 NOTE — PATIENT INSTRUCTIONS
Consider the cefaly device, www.cefaly.us, please research, this is TENs unit designed in [place] and was FDA approved in the early 2010s for migraines.         Tomorrow start the steroid taper, on full stomach at breakfast time only, 3 pills for 2 days, 2 pills for 2 days, 1 pill for 2 days, off       Stop the imitrex (sumatriptan)  Try the maxalt (rizatriptan) 1 melt at onset headache, second melt 2 hours later if needed, no more than 2 melts day/3days use in week     Slowly cut back on the caffeine, increase your hydration with water

## 2022-06-20 PROBLEM — R19.7 DIARRHEA: Status: RESOLVED | Noted: 2019-10-03 | Resolved: 2022-06-20

## 2022-06-20 PROBLEM — G89.29 NECK PAIN, CHRONIC: Status: ACTIVE | Noted: 2022-06-20

## 2022-06-20 PROBLEM — M54.2 NECK PAIN, CHRONIC: Status: RESOLVED | Noted: 2022-06-20 | Resolved: 2022-06-20

## 2022-06-20 PROBLEM — M54.2 NECK PAIN, CHRONIC: Status: ACTIVE | Noted: 2022-06-20

## 2022-06-20 PROBLEM — G89.29 NECK PAIN, CHRONIC: Status: RESOLVED | Noted: 2022-06-20 | Resolved: 2022-06-20

## 2022-06-20 PROBLEM — Z82.49 FAMILY HISTORY OF CEREBRAL ANEURYSM: Status: ACTIVE | Noted: 2022-06-20

## 2022-06-24 ENCOUNTER — TELEPHONE (OUTPATIENT)
Dept: NEUROLOGY | Facility: CLINIC | Age: 32
End: 2022-06-24
Payer: MEDICAID

## 2022-06-24 RX ORDER — RIMEGEPANT SULFATE 75 MG/75MG
75 TABLET, ORALLY DISINTEGRATING ORAL ONCE AS NEEDED
Qty: 8 TABLET | Refills: 6 | Status: SHIPPED | OUTPATIENT
Start: 2022-06-24 | End: 2022-06-24

## 2022-06-24 NOTE — TELEPHONE ENCOUNTER
Called patient and notified that CARLOS ALBERTO Zuniga had sent in refills for Maxalt and that the Nurtec has been sent to [facility] so they can complete PA. Verbalized understanding.

## 2022-06-24 NOTE — TELEPHONE ENCOUNTER
I wrote for nurtec, and sent that to the [facility] [place] (as he lives on [facility]). If he needs me to send to [facility] here in [place], I will....      I wrote multiple refills for maxalt, if that is not helping, lets see if the nurtec helps instead.     NEALJ

## 2022-06-24 NOTE — TELEPHONE ENCOUNTER
----- Message from Sandra Torres MA sent at 6/24/2022  9:47 AM CDT -----  Type:  RX Refill Request    Who Called:  pt  Refill or New Rx:  refill  RX Name and Strength:  rizatriptan (MAXALT-MLT) 10 MG disintegrating tablet   How is the patient currently taking it? (ex. 1XDay):  as directed  Is this a 30 day or 90 day RX:  30  Preferred Pharmacy with phone number:    [id number] - [place] - [address] AT [facility] [address]  [address] [address]  Phone: [phone] Fax: [phone]    Local or Mail Order:  local  Ordering Provider:  Hilda Mills Call Back Number:  [phone] (home)     Additional Information:  pt is currently out of meds & has to work all weekend & really needs this medication--sts medication above is working but symptoms come back right after medication wears off--wants to know if he can try Nurtec? please advise--thank you

## 2022-07-11 ENCOUNTER — OFFICE VISIT (OUTPATIENT)
Dept: NEUROLOGY | Facility: CLINIC | Age: 32
End: 2022-07-11
Payer: MEDICAID

## 2022-07-11 VITALS
HEART RATE: 77 BPM | RESPIRATION RATE: 17 BRPM | BODY MASS INDEX: 26.22 KG/M2 | HEIGHT: 68 IN | DIASTOLIC BLOOD PRESSURE: 78 MMHG | WEIGHT: 173 LBS | SYSTOLIC BLOOD PRESSURE: 117 MMHG

## 2022-07-11 DIAGNOSIS — G43.719 INTRACTABLE CHRONIC MIGRAINE WITHOUT AURA AND WITHOUT STATUS MIGRAINOSUS: Primary | ICD-10-CM

## 2022-07-11 DIAGNOSIS — M54.2 NECK PAIN: ICD-10-CM

## 2022-07-11 PROCEDURE — 1160F PR REVIEW ALL MEDS BY PRESCRIBER/CLIN PHARMACIST DOCUMENTED: ICD-10-PCS | Mod: CPTII,,, | Performed by: PHYSICIAN ASSISTANT

## 2022-07-11 PROCEDURE — 99213 PR OFFICE/OUTPT VISIT, EST, LEVL III, 20-29 MIN: ICD-10-PCS | Mod: S$PBB,,, | Performed by: PHYSICIAN ASSISTANT

## 2022-07-11 PROCEDURE — 3008F BODY MASS INDEX DOCD: CPT | Mod: CPTII,,, | Performed by: PHYSICIAN ASSISTANT

## 2022-07-11 PROCEDURE — 3074F SYST BP LT 130 MM HG: CPT | Mod: CPTII,,, | Performed by: PHYSICIAN ASSISTANT

## 2022-07-11 PROCEDURE — 1159F MED LIST DOCD IN RCRD: CPT | Mod: CPTII,,, | Performed by: PHYSICIAN ASSISTANT

## 2022-07-11 PROCEDURE — 3074F PR MOST RECENT SYSTOLIC BLOOD PRESSURE < 130 MM HG: ICD-10-PCS | Mod: CPTII,,, | Performed by: PHYSICIAN ASSISTANT

## 2022-07-11 PROCEDURE — 99999 PR PBB SHADOW E&M-EST. PATIENT-LVL III: ICD-10-PCS | Mod: PBBFAC,,, | Performed by: PHYSICIAN ASSISTANT

## 2022-07-11 PROCEDURE — 3008F PR BODY MASS INDEX (BMI) DOCUMENTED: ICD-10-PCS | Mod: CPTII,,, | Performed by: PHYSICIAN ASSISTANT

## 2022-07-11 PROCEDURE — 3078F DIAST BP <80 MM HG: CPT | Mod: CPTII,,, | Performed by: PHYSICIAN ASSISTANT

## 2022-07-11 PROCEDURE — 3078F PR MOST RECENT DIASTOLIC BLOOD PRESSURE < 80 MM HG: ICD-10-PCS | Mod: CPTII,,, | Performed by: PHYSICIAN ASSISTANT

## 2022-07-11 PROCEDURE — 99999 PR PBB SHADOW E&M-EST. PATIENT-LVL III: CPT | Mod: PBBFAC,,, | Performed by: PHYSICIAN ASSISTANT

## 2022-07-11 PROCEDURE — 99213 OFFICE O/P EST LOW 20 MIN: CPT | Mod: S$PBB,,, | Performed by: PHYSICIAN ASSISTANT

## 2022-07-11 PROCEDURE — 1160F RVW MEDS BY RX/DR IN RCRD: CPT | Mod: CPTII,,, | Performed by: PHYSICIAN ASSISTANT

## 2022-07-11 PROCEDURE — 99213 OFFICE O/P EST LOW 20 MIN: CPT | Mod: PBBFAC,PO | Performed by: PHYSICIAN ASSISTANT

## 2022-07-11 PROCEDURE — 1159F PR MEDICATION LIST DOCUMENTED IN MEDICAL RECORD: ICD-10-PCS | Mod: CPTII,,, | Performed by: PHYSICIAN ASSISTANT

## 2022-07-11 RX ORDER — RIMEGEPANT SULFATE 75 MG/75MG
75 TABLET, ORALLY DISINTEGRATING ORAL DAILY PRN
COMMUNITY
Start: 2022-06-24 | End: 2023-02-28 | Stop reason: SDUPTHER

## 2022-07-11 RX ORDER — FLUTICASONE PROPIONATE 50 MCG
2 SPRAY, SUSPENSION (ML) NASAL DAILY
COMMUNITY
Start: 2022-07-01 | End: 2023-02-01 | Stop reason: SDUPTHER

## 2022-07-11 RX ORDER — AZELASTINE 1 MG/ML
SPRAY, METERED NASAL
COMMUNITY
Start: 2022-07-01 | End: 2023-02-01 | Stop reason: SDUPTHER

## 2022-07-11 RX ORDER — LEVOCETIRIZINE DIHYDROCHLORIDE 5 MG/1
5 TABLET, FILM COATED ORAL DAILY
COMMUNITY
Start: 2022-07-01 | End: 2023-02-01

## 2022-07-11 NOTE — PROGRESS NOTES
[facility]  [address] [facility] [address]  [place] [address]  Phone:[phone]  Fax: [phone]  Follow up visit    Patient Name: Lance Andrade  : 1990  MRN:  [id number]  Today: 2022   LOV: 6/10/2022  chief complaint: Headache    PCP: Jerry Navarro MD.    Assessment:   Lance Andrade is a 31 y.o. with a PMHx of: substance abuse (per chart review, including opiate), migraines, ADHD and neck pain  whom presents solo in follow up. HA appear to be chronic migrainous resistant to treatment. He feels pain from his neck also causing his HA (asks for referral to back and spine center). He also feels sinus problems causing his neck pain (suggested he talk to his PCP)      Review:    ICD-10-CM ICD-9-CM   1. Intractable chronic migraine without aura and without status migrainosus  G43.719 346.71   2. Neck pain  M54.2 723.1         Plan:   Continue to track HA              -if HA change in quality/nature, will get updated imaging study     For HA Prevention:  Patient meets the criteria for chronic migraine. In summary, He has headaches/migraines >15 days per month  and last >4 hours if untreated. Specifics of duration, frequency and strength are listed in the HPI (please refer to this section).  This pattern has continued for >3 months.  He has failed at least three preventive medications (full list of medications is listed below in the HPI under Therapies tried in past)  I have therefore recommended a trial of Botox via the PREEMPT protocol for migraine prophylaxis.   We discussed procedure day at length (e.g., no NSAIDs or ASA), wear old/loose fitting clothing, no make up, eat meals/stay well hydrated and secure a ride if necessary. Also, discussed it can take up to 2 sessions of botox to get results desired. Lastly, we discussed procedure at length, 31 injections done in the office, potential complications not limited to muscle weakness, respiratory issues, or  "worst case scenario-death. The patient voiced understanding and wished to move forward.  Muscles to be injected:   10 units divided in 2 sites  Procerus 5 units in 1 site  Frontalis 20 units divided in 4 sites  Temporalis 40 units divided in 8 sites  Occipitalis 30 units divided in 6 sites  Cervical paraspinal 20 units divided in 4 sites  Trapezius 30 units divided in 6 sites  A total dose of 155 units of botox to be used with  45 units to be discarded/wasted (unavoidable).      For HA :  nurtec    To break up Headaches:  Course of prednisone did not work  Can consider nerve blocks in future (did not discuss at today's appt)     Other:  Referral at his request to back/spine center           All test results as well as any necessary instructions were reviewed and discussed with patient.    Review:  Orders Placed This Encounter    Ambulatory Consult to Back & Spine Clinic    Prior authorization Order         Patient to return to PCP/other specialists for all other problems  Patient to continue on all medications as Rx'd  Office notes available online   RTO- botox 1   The patient indicates understanding of these issues and agrees to the plan.    HPI:   Lance Andrade is a 31 y.o.right handed, male with a PMHx of: substance abuse (per chart review, including opiate), migraines, ADHD and neck pain  whom presents solo in follow up.     Presents late to appt (>15 mins), still seen.      At last visit: nurtec, maxalt and prednisone written.   Still having daily HA (30 HD/30)  LEFT side of the head  Average pain: 7-8/10  Pain lasts on average a few hours (takes something near daily for them), if he didn't take something would be ALL day   No vision changes  Steroids didn't make HA go away but may have "eased" some of the pain  maxalt and nurtec take HA away for the day   Last COHEN yesterday   Has stopped the maxalt  "I am going through nurtec"  Feels his neck pain and his sinus problems are causing " "a lot of his HA   No new weakness, no new falls  No other new concerns   No HA at present     At last visit: started on verapamil and nurtec.   Self stopped verapamil  Felt nurtec helped-had samples of this (years ago)    HA came back 2-3 months ago-no trigger identified   Location: left hemicrania  Severity: moderate-severe  Duration:2-3 hours as he takes something for it (OTC daily), or if too bad will take imitrex or muscle relaxants. The imitrex at 50 mg isn't very effective. Will also take large amounts of caffeine to break up the HA   Frequency:daily (30 HD/30)    Associated factors (bolded positive) WITH HA ( or migraine): Nausea, vomiting, photophobia, phonophobia, tinnitus, scalp pain, vision loss, diplopia, scintillations, eye pain, jaw pain, weakness?    Tried:caffeine, tylenol, imitrex, muscle relaxers   Triggers (in bold): neck pain,  stress, lack of sleep, too much caffeine, too little caffeine, weather change, seasonal change, strong odours, bright lights, sunlight  Last HA: 2 night ago   Positives in bold: Hx of Kidney Stones, asthma, GI bleed, osteoporosis, CAD/MI, CVA/TIA, DM  <---denies  Imaging on file: none of brain in past 3 years at [facility]   Therapies tried in past: (failures to be marked, if known---why did it fail?)  Topamax  Depakote  Propranolol  treximet  imitrex  maxalt  relpax.   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 Positive FHX of migraine affecting his mother. The frequency of the attacks is quite variable, ranging from daily to periods of headache freedom lasting weeks. He reports the headaches "come in clusters." He was taking analgesics, naproxen, ibuprofen, goodie powders QID on a daily basis. His own reading made him aware of Medication Overuse Headache. He tapered himself " off and gradually got better. He reports very severe pain in the process but he endured it and is now a lot better.He has tried a number of preventives including Topamax, Depakote, Propranolol. For acute treatment he has tried triptans, all associated with side effects, including treximet, imitrex, maxalt, relpax. Fioricet only partially effective. Per records, he has history of substance abuse, and was monitored for months in [id number].    Headache questionnaire     1. When did your Headaches start?               As long as I can remember        2. Where are your headaches located?              Temple/side mostly left        3. Your headache's characteristics:              Excruciating, Pressure, Throbbing, Pounding        4. How long does the headache last?              Minutes hours        5. How often does the headache occur?              daily, weekly and cluster        6. Are your headaches preceded or accompanied by other symptoms? yes              If yes, please describe.  Sinus pressure, neck pain         7. Does the headache awaken you at night? yes              If so, how often? Most nights when I am having headaches           8. Please cristina the word that best describes your headache's intensity:               severe        9. Using a scale of 1 through 10, with 0 = no pain and 10 = the worst pain:              What score is your headache now? 0              What score is your headache at its worst? 10              What score is your headache at its best? 0           10. Possible associated headache symptoms:  [x]?  Sensitivity to light              [x]? Dizziness                [x]? Nasal or sinus pressure/ pain          [x]? Sensitivity to noise             []? Vertigo                    [x]? Problems with concentration  [x]? Sensitivity to smells           []? Ringing in ears       [x]? Problems with memory                    [x]? Blurred vision                     [x]? Irritability                  [x]? Problems with task completion   []? Double vision                     [x]? Anger                      [x]?  Problems with relaxation  [x]? Loss of appetite                  [x]? Anxiety                   [x]? Neck tightness, Neck pain  [x]? Nausea                               [x]? Nasal congestion  []? Vomiting                                     11. Headache improving factors:  []? Sleep                      [x]? Heat  [x]? Darkness                [x]? Ice  []? Local pressure       []? Menses (period)  [x]? Massage                 []? Medications:          12. Headache worsening factors:   [x]? Fatigue       []? Sneezing                []? Changes in Weather  []? Light           [x]? Bending Over         [x]? Stress  []? Noise          []? Ovulation                []? Multiple Sclerosis Flare-Up  [x]? Smells        []? Menses                   []? Food   []? Coughing    [x]? Alcohol        13. Number of caffeinated drinks per day: 0        14. Number of diet drinks per day:0        15. Have you seen any other [facility] within the last 3 years?  no     Please Check any Medications or Procedures tried/failed for Headache     AED Neuromodulators   MAOIs   Ergot Alkaloids     Acetazolamide (Diamox) []?  Phenelzine (Nardil) []?  Dihydroergotamine (Migranal) []?    Carbamazepine (Tegretol) []?  Tranylcypromine (Parnate) []?  Ergotamine (Ergomar) []?    Gabapentin (Neurontin) [x]?  Antihistamine/Serotonergic   Triptans     Lacosamide (Vimpat) []?  Cyproheptadine (Periactin) []?  Almotriptan (Axert) []?    Lamotrigine (Lamictal) []?  Antihypertensives   Eletriptan (Relpax) [x]?    Levatiracetam (Keppra) []?  Atenolol (Tenormin) []?  Frovatriptan (Frova) []?    Oxcarbazepine (Trileptal) []?  Bisoprostol (Zebeta) []?  Naratriptan (Amerge) []?    Phenobarbital []?  Candesartan (Atacand) []?  Rizatriptan (Maxalt) [x]?        Nebivolol (Bystolic)   Sumatriptan (Imitrex) [x]?    Levetiracetam (Keppra)    Cardeilol (Coreg) []?  Zolmitriptan (Zomig) [x]?    Phenytoin (Dilantin) []?  Diltiazem (Cardizem) []?  Treximet     Pregabalin (Lyrica) []?  Lisinopril (Prinivil, Zestril) []?  Combo Abortives     Primidone (Mysoline) []?  Metoprolol (Toprol) []?  BC Powder []?    Tiagabine (Gabatril) []?  Nadolol (Corgard) []?  Butalbital and Acetaminophen (Bupap) []?    Topiramate (Topamax)  (Trokendi) [x]?  Nicardipine (Cardene) []?      Vigabatrin (Sabril) []?  Nimodipine (Nimotop) []?  Butalbital, Acetaminophen, and caffeine (Fioricet) []?    Valproic Acid (Depakote) (Divalproex Sodium) [x]?  Propranolol (Inderal) [x]?      Zonisamide (Zonegran) []?  Telmisartan (Micardis) []?  Butalbital, Aspirin, and caffeine (Fiorinal) []?    Benzodiazepines   Timolol (Blocadren) []?      Alprazolam (Xanax) []?  Verapamil (Calan, Verelan) []?  Butalbital, Caffeine, Acetaminophen, and Codeine (Fioricet with Codeine) [x]?    Diazepam (Valium) []?  NSAIDs       Lorazepam (Ativan) []?  Acetaminophen (Tylenol) []?      Clonazepam (Klonopin) []?  Acetylsalicylic Acid (Aspirin) []?  Butalbital, Caffeine, Aspirin, and Codeine  (Fiorinal with Codeine) []?    Antidepressants   Diclofenac (Cambia) []?      Amitriptyline (Elavil) []?  Ibuprofen (Motrin) [x]?      Desipramine (Norpramin) []?  Indomethacin (Indocin) []?  Aspirin, Caffeine, and Acetaminophen (Excedrin) (Goodys) [x]?    Doxepin (Sinequan) []?  Ketoprofen (Orudis) []?      Fluoxetine (Prozac) []?  Ketorolac (Toradol) []?  Acetaminophen, Dichloralphenazone, and Isometheptene (Midrin) []?    Imipramine (Tofranil) []?  Naproxen (Anaprox) (Aleve) [x]?      Nortriptyline (Pamelor) []?  Meclofenamic Acid (Meclomen) []?      Venlafaxine (Effexor) []?  Meloxicam (Mobic) []?  Procedures     Desvenlafazine (Pristiq) []?  Monoclonals   Greater occipital nerve block []?    Duloxetine (Cymbalta) []?  Eptinezumab []?  Cervical, Thoracic, Lumbar radiofrequency ablation []?    Trazadone []?  Erenumab-aooe  "(Aimovig) []?  Spenopalatine ganglion block []?    Wellbutrin []?  Galcanezumab (Emgality) []?  Occipital neuro stimulation []?    Protriptyline (Vivactil) []?  Fremanazumab-vfrm (Ajovy)   Cervical, Thoracic, Lumbar, Caudal Epidural steroid injection [x]?    Escitalopram (Lexapro) []?  Other []?  Sacroiliac joint steroid injection []?    Celexa []?  Memantine (Namenda) []?  Transforaminal epidural steroid injection []?        Botox []?  Facet joint injections []?        Baclofen (Lioresal) []?  Cervical, Thoracic, Lumbar medial branch blocks []?            Cefaly []?            Gamma Core []?            Iovera []?            Transcranial Magnetic stimulation []?                                   "  Medication Reconciliation:   Current Outpatient Medications   Medication Sig Dispense Refill    dextroamphetamine-amphetamine 30 mg Tab Take 1 tablet (30 mg total) by mouth 2 (two) times daily. For diagnosis code F90.9 60 tablet 0    montelukast (SINGULAIR) 10 mg tablet 1 tablet po QHS 90 tablet 3    predniSONE (DELTASONE) 20 MG tablet On full stomach (breakfast): 3 tabs for 2 days, 2 tabs for 2 days, 1 tab for 2 days. Finish 12 tablet 0    PREVIDENT 5000 ORTHO DEFENSE 1.1 % Pste use as directed      sildenafiL (VIAGRA) 100 MG tablet Take 1 tablet (100 mg total) by mouth daily as needed for Erectile Dysfunction. 90 tablet 1    sumatriptan (IMITREX) 50 MG tablet TAKE 1 TABLET(50 MG) BY MOUTH DAILY AS NEEDED FOR MIGRAINE 10 tablet 11     No current facility-administered medications for this visit.     Review of patient's allergies indicates:   Allergen Reactions    Penicillins Rash       PMHx:  Past Medical History:   Diagnosis Date    a Congenital pulmonary valve stenosis     Was Cleared By Pediatric Cardiology; Did Not Require Any Corrective Sx    b Elevated BP W/O DX Of HTN     1/24/19 RXd A Low Salt Diet And Recommended He Stop OTC Creatine Muscle Building Supplements    b Family H/O Hypertension     Both Of " Parents    k Erectile Dysfunction     10/12/15 Testosterone = Normal    k Left varicocele S/P Ligation     k Urinary Fequency     7/16/20 Ordered A U/A And UCx    l Chronic Recurrent Neck Pain     6/20/22 Referred To Dr. Keith Chu (PM&R); 6/20/22 C-Spine XRays = Unremarkable (See Report); Outpatient PT Has Helped A Bit In The Past; He Sees A Chiropracter For This; 09/2018 C-Spine MRI = Was Reportedly Unremarkable    m Family H/O Cerebral Aneurysm     6/20/22 Brain MRA = Ordered    m Migraines     7/16/20 And 7/26/18 Referred To Dr. Megan Nunes, And RXd Imitrex PRN    n Attention Deficit Disorder ###    7/10/17 Increased His 30 Mg Adderall Bid To Adderall 30 Mg Bid    n Hx Of Opiod Abuse In 2012 ###    Had A  And Was In A Tx Program Then    n Therapeutic Drug Monitoring     o Allergic rhinosinusitis     6/20/22 Referred To Dr. Liam Garcia    o H/O Nasal Septoplasty In 2013     This Helped With His Migraines    o Right Lower Eyelid Lesions     1/18/17 Referred To Dr. Andrews Dodd; HSV ??   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, RN   Social History     Socioeconomic History    Marital status: Single   Occupational History    Occupation: Works restaurant(, etc)     Employer: [employer]   Tobacco Use    Smoking status: Current Some Day Smoker     Types: Vaping with nicotine    Smokeless tobacco: Former User    Tobacco comment: chews tobacco   Substance and Sexual Activity    Alcohol use: Yes     Comment: soc     "Drug use: No     Comment: past Hx abuse, denies any active use and in court ordered monitoring program, does not want to have any narcotic postop    Sexual activity: Yes     Partners: Female           Labs:   Reviewed in chart     Imaging:   Reviewed in chart       Other testing:  Reviewed in chart     Note: I have independently reviewed any/all imaging/labs/tests and agree with the report (s) as documented.  Any discrepancies will be as noted/demarcated by free text.  ANN DELGADO 7/11/2022                     ROS:   Review Of Systems (questions asked, positive or additions in BOLD)  Gen: Weight change, fatigue/malaise, pyrexia   HEENT: Tinnitus, headache,  blurred vision, eye pain, diplopia, photophobia,  nose bleeds, congestion, sore throat, jaw pain, scalp pain, neck stiffness   Card: Palpitations, CP   Pulm: SOB, cough   Vas: Easy bruising, easy bleeding   GI: N/V/D/C, incontinence, hematemesis, hematochezia    : incontinence, hematuria   Endocrine: Temp intolerance, polyuria, polydipsia   M/S: Neck pain, myalgia, back pain, joint pain, falls    Neuro: PER HPI   PSY: Memory loss, confusion, depression, anxiety, trouble in sleep, hallucinations          EXAM:   /78 (BP Location: Right arm, Patient Position: Sitting, BP Method: Medium (Automatic))   Pulse 77   Resp 17   Ht 5' 8" (1.727 m)   Wt 78.5 kg (173 lb)   BMI 26.30 kg/m²    GEN:  NAD, sits with arms folded during much of visit  HEENT: NC/AT     NEURO:  Mental Status:  Awake, alert and appropriately oriented to time, place, and person.  Normal attention and concentration.  Speech is fluent and appropriate language function (I.e., comprehension)    Cranial Nerves:     Extraocular movements are intact and without nystagmus.  Visual fields are full to confrontation testing.   Facial movement is symmetric.   Hearing is normal. Uvula in midline. DROM of neck in all (6) directions, shoulder shrug symmetrical. Tongue in midline without " fasiculation.     Motor:  Antigravity in all limbs  No drift  No resting tremor   Gait and Stance: Normal manner of stance and gait function testing.         This document has been electronically signed by Mr. Syed Stevens MPA, PA-C on 7/11/2022, I have personally typed this message using the EMR.       Dr Willie MD  was available during today's visit.     Personal Protective Equipment:    Personal Protective Equipment was used during this encounter including: KN95 and non latex gloves.          CC: Jerry Navarro MD

## 2022-08-01 ENCOUNTER — PROCEDURE VISIT (OUTPATIENT)
Dept: NEUROLOGY | Facility: CLINIC | Age: 32
End: 2022-08-01
Payer: MEDICAID

## 2022-08-01 VITALS
SYSTOLIC BLOOD PRESSURE: 121 MMHG | DIASTOLIC BLOOD PRESSURE: 89 MMHG | RESPIRATION RATE: 17 BRPM | HEART RATE: 71 BPM | HEIGHT: 68 IN | WEIGHT: 173 LBS | BODY MASS INDEX: 26.22 KG/M2

## 2022-08-01 DIAGNOSIS — G43.719 INTRACTABLE CHRONIC MIGRAINE WITHOUT AURA AND WITHOUT STATUS MIGRAINOSUS: Primary | ICD-10-CM

## 2022-08-01 PROCEDURE — 64615 CHEMODENERV MUSC MIGRAINE: CPT | Mod: S$PBB,,, | Performed by: PHYSICIAN ASSISTANT

## 2022-08-01 PROCEDURE — 64615 CHEMODENERV MUSC MIGRAINE: CPT | Mod: PBBFAC,PO | Performed by: PHYSICIAN ASSISTANT

## 2022-08-01 PROCEDURE — 64615 PR CHEMODENERVATION OF MUSCLE FOR CHRONIC MIGRAINE: ICD-10-PCS | Mod: S$PBB,,, | Performed by: PHYSICIAN ASSISTANT

## 2022-08-01 RX ADMIN — ONABOTULINUMTOXINA 200 UNITS: 100 INJECTION, POWDER, LYOPHILIZED, FOR SOLUTION INTRADERMAL; INTRAMUSCULAR at 12:08

## 2022-08-01 NOTE — PROCEDURES
[facility]  Headache Clinic  [address] [place] [address]  Phone:[phone]  Fax: [phone]  Botox Visit, #1    Chief Complaint   Patient presents with    Botulinum Toxin Injection         A/P:      ICD-10-CM ICD-9-CM   1. Intractable chronic migraine without aura and without status migrainosus  G43.719 346.71     Botox for migraine    PROCEDURE NOTE:  BOTOX injection is indicated for the prophylaxis of headaches in adult patients with chronic  migraine. Patient meets indications for BOTOX therapy.  Potential risks and benefits were reviewed. Side effects including, but not limited to, potential  systemic allergic reactions of the anaphylactic type as well as local injection site reactions of  blepharoptosis, diplopia, infection, bleeding, pain, redness and bruising were reviewed. The  potential for headaches and/or neck pain post procedure were reviewed.  The patient's questions were answered. The patient signed a consent form. Patient  understands that depending on their insurance carrier, there may be a copay for this treatment.  BOTOX was reconstituted using   two 100 unit vials and diluted with 4 mL of sterile saline.  BOTOX was injected as per the PREEMPT trial injection paradigm with dose administered as 5  unit intramuscular (IM) injections per site using a sterile, 30-gauge 0.5 inch needle as follows:  Muscle Dose, # of Sites   10 units divided in 2 sites  Procerus 5 units in 1 site  Frontalis 20 units divided in 4 sites  Temporalis 40 units divided in 8 sites  Occipitalis 30 units divided in 6 sites  Cervical paraspinal 20 units divided in 4 sites  Trapezius 30 units divided in 6 sites  Each site was cleaned with alcohol prior to injection. A total dose of 155 units were injected. 45  units were discarded/wasted.  The patient tolerated the procedure well with no immediate complications.  MEDICATION INFO:  NDC 3767-5685-42   Lot #  [id number]  Exp09/2023         Follow up in 3 months for repeat injection       Syed Stevens MPA, PA-C  Attending available-Dr Willie MD         Personal Protective Equipment:    Personal Protective Equipment was used during this encounter including;   KN95  and non latex gloves.     08/01/2022 12:05 PM    CC: Jerry Navarro MD

## 2022-09-13 ENCOUNTER — OFFICE VISIT (OUTPATIENT)
Dept: NEUROLOGY | Facility: CLINIC | Age: 32
End: 2022-09-13
Payer: COMMERCIAL

## 2022-09-13 VITALS
BODY MASS INDEX: 26.22 KG/M2 | HEART RATE: 67 BPM | DIASTOLIC BLOOD PRESSURE: 89 MMHG | RESPIRATION RATE: 17 BRPM | HEIGHT: 68 IN | WEIGHT: 173 LBS | SYSTOLIC BLOOD PRESSURE: 133 MMHG

## 2022-09-13 DIAGNOSIS — G43.719 INTRACTABLE CHRONIC MIGRAINE WITHOUT AURA AND WITHOUT STATUS MIGRAINOSUS: Primary | ICD-10-CM

## 2022-09-13 PROCEDURE — 1159F MED LIST DOCD IN RCRD: CPT | Mod: CPTII,S$GLB,, | Performed by: PHYSICIAN ASSISTANT

## 2022-09-13 PROCEDURE — 1160F PR REVIEW ALL MEDS BY PRESCRIBER/CLIN PHARMACIST DOCUMENTED: ICD-10-PCS | Mod: CPTII,S$GLB,, | Performed by: PHYSICIAN ASSISTANT

## 2022-09-13 PROCEDURE — 3008F PR BODY MASS INDEX (BMI) DOCUMENTED: ICD-10-PCS | Mod: CPTII,S$GLB,, | Performed by: PHYSICIAN ASSISTANT

## 2022-09-13 PROCEDURE — 1159F PR MEDICATION LIST DOCUMENTED IN MEDICAL RECORD: ICD-10-PCS | Mod: CPTII,S$GLB,, | Performed by: PHYSICIAN ASSISTANT

## 2022-09-13 PROCEDURE — 1160F RVW MEDS BY RX/DR IN RCRD: CPT | Mod: CPTII,S$GLB,, | Performed by: PHYSICIAN ASSISTANT

## 2022-09-13 PROCEDURE — 3008F BODY MASS INDEX DOCD: CPT | Mod: CPTII,S$GLB,, | Performed by: PHYSICIAN ASSISTANT

## 2022-09-13 PROCEDURE — 99999 PR PBB SHADOW E&M-EST. PATIENT-LVL III: ICD-10-PCS | Mod: PBBFAC,,, | Performed by: PHYSICIAN ASSISTANT

## 2022-09-13 PROCEDURE — 3079F DIAST BP 80-89 MM HG: CPT | Mod: CPTII,S$GLB,, | Performed by: PHYSICIAN ASSISTANT

## 2022-09-13 PROCEDURE — 3075F PR MOST RECENT SYSTOLIC BLOOD PRESS GE 130-139MM HG: ICD-10-PCS | Mod: CPTII,S$GLB,, | Performed by: PHYSICIAN ASSISTANT

## 2022-09-13 PROCEDURE — 99213 PR OFFICE/OUTPT VISIT, EST, LEVL III, 20-29 MIN: ICD-10-PCS | Mod: S$GLB,,, | Performed by: PHYSICIAN ASSISTANT

## 2022-09-13 PROCEDURE — 99213 OFFICE O/P EST LOW 20 MIN: CPT | Mod: S$GLB,,, | Performed by: PHYSICIAN ASSISTANT

## 2022-09-13 PROCEDURE — 3075F SYST BP GE 130 - 139MM HG: CPT | Mod: CPTII,S$GLB,, | Performed by: PHYSICIAN ASSISTANT

## 2022-09-13 PROCEDURE — 99999 PR PBB SHADOW E&M-EST. PATIENT-LVL III: CPT | Mod: PBBFAC,,, | Performed by: PHYSICIAN ASSISTANT

## 2022-09-13 PROCEDURE — 3079F PR MOST RECENT DIASTOLIC BLOOD PRESSURE 80-89 MM HG: ICD-10-PCS | Mod: CPTII,S$GLB,, | Performed by: PHYSICIAN ASSISTANT

## 2022-09-13 NOTE — PROGRESS NOTES
[facility]  [address] [facility] [address]  [place] [address]  Phone:[phone]  Fax: [phone]  Follow up visit    Patient Name: Lance Andrade  : 1990  MRN:  [id number]  Today: 2022   LOV: 2022  chief complaint: Headache    PCP: Jerry Navarro MD.    Assessment:   Lance Andrade is a 31 y.o. with a PMHx of: substance abuse (per chart review, including opiate), migraines, ADHD and neck pain  whom presents solo in follow up. HA appear to be chronic migrainous resistant to treatment. Botox has improved his HA.     Review:    ICD-10-CM ICD-9-CM   1. Intractable chronic migraine without aura and without status migrainosus  G43.719 346.71           Plan:   Continue to track HA              -if HA change in quality/nature, will get updated imaging study     For HA Prevention:  Continue on to botox #2     For HA :  nurtec    To break up Headaches:  Course of prednisone did not work  Can consider nerve blocks in future (did not discuss at today's appt)     Other:  Contact new insurance and ask about copay for botox (get in writing).             -I discussed unfortunately I won't be able to tell him the exact cost of copay.           All test results as well as any necessary instructions were reviewed and discussed with patient.    Review:           Patient to return to PCP/other specialists for all other problems  Patient to continue on all medications as Rx'd  Office notes available online   RTO- botox 2  The patient indicates understanding of these issues and agrees to the plan.    HPI:   Lance Andrade is a 31 y.o.right handed, male with a PMHx of: substance abuse (per chart review, including opiate), migraines, ADHD and neck pain  whom presents solo in follow up.       At previous visit, botox 1 given. Has nurtec for HA . Referral to back and spine center.   3 HD since last botox visit (historically was 30 HD/)   No side effects  "from botox  Last HA was this past week, alcohol possibly triggered   Nurtec work well   Pain along LEFT hemicrania   Average duration: 2 -4 hours  Pain is 8/10       From previous visits:  Presents late to appt (>15 mins), still seen.     nurtec, maxalt and prednisone written.   Still having daily HA (30 HD/30)  LEFT side of the head  Average pain: 7-8/10  Pain lasts on average a few hours (takes something near daily for them), if he didn't take something would be ALL day   No vision changes  Steroids didn't make HA go away but may have "eased" some of the pain  maxalt and nurtec take HA away for the day   Last COHEN yesterday   Has stopped the maxalt  "I am going through nurtec"  Feels his neck pain and his sinus problems are causing a lot of his HA   No new weakness, no new falls  No other new concerns   No HA at present     At last visit: started on verapamil and nurtec.   Self stopped verapamil  Felt nurtec helped-had samples of this (years ago)    HA came back 2-3 months ago-no trigger identified   Location: left hemicrania  Severity: moderate-severe  Duration:2-3 hours as he takes something for it (OTC daily), or if too bad will take imitrex or muscle relaxants. The imitrex at 50 mg isn't very effective. Will also take large amounts of caffeine to break up the HA   Frequency:daily (30 HD/30)    Associated factors (bolded positive) WITH HA ( or migraine): Nausea, vomiting, photophobia, phonophobia, tinnitus, scalp pain, vision loss, diplopia, scintillations, eye pain, jaw pain, weakness?    Tried:caffeine, tylenol, imitrex, muscle relaxers   Triggers (in bold): neck pain,  stress, lack of sleep, too much caffeine, too little caffeine, weather change, seasonal change, strong odours, bright lights, sunlight  Last HA: 2 night ago   Positives in bold: Hx of Kidney Stones, asthma, GI bleed, osteoporosis, CAD/MI, CVA/TIA, DM  <---denies  Imaging on file: none of brain in past 3 years at [facility]   Therapies tried in " "past: (failures to be marked, if known---why did it fail?)  Topamax  Depakote  Propranolol  treximet  imitrex  maxalt  relpax.   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 Positive FHX of migraine affecting his mother. The frequency of the attacks is quite variable, ranging from daily to periods of headache freedom lasting weeks. He reports the headaches "come in clusters." He was taking analgesics, naproxen, ibuprofen, goodie powders QID on a daily basis. His own reading made him aware of Medication Overuse Headache. He tapered himself off and gradually got better. He reports very severe pain in the process but he endured it and is now a lot better.He has tried a number of preventives including Topamax, Depakote, Propranolol. For acute treatment he has tried triptans, all associated with side effects, including treximet, imitrex, maxalt, relpax. Fioricet only partially effective. Per records, he has history of substance abuse, and was monitored for months in [id number].    Headache questionnaire     1. When did your Headaches start?               As long as I can remember        2. Where are your headaches located?              Temple/side mostly left        3. Your headache's characteristics:              Excruciating, Pressure, Throbbing, Pounding        4. How long does the headache last?              Minutes hours        5. How often does the headache occur?              daily, weekly and cluster        6. Are your headaches preceded or accompanied by other symptoms? yes              If yes, please describe.  Sinus pressure, neck pain         7. Does the headache awaken you at night? yes              If so, how often? Most nights when I am having headaches           8. Please cristina the word that best describes " your headache's intensity:               severe        9. Using a scale of 1 through 10, with 0 = no pain and 10 = the worst pain:              What score is your headache now? 0              What score is your headache at its worst? 10              What score is your headache at its best? 0           10. Possible associated headache symptoms:  [x]  Sensitivity to light              [x] Dizziness                [x] Nasal or sinus pressure/ pain          [x] Sensitivity to noise             [] Vertigo                    [x] Problems with concentration  [x] Sensitivity to smells           [] Ringing in ears       [x] Problems with memory                    [x] Blurred vision                     [x] Irritability                 [x] Problems with task completion   [] Double vision                     [x] Anger                      [x]  Problems with relaxation  [x] Loss of appetite                  [x] Anxiety                   [x] Neck tightness, Neck pain  [x] Nausea                               [x] Nasal congestion  [] Vomiting                                     11. Headache improving factors:  [] Sleep                      [x] Heat  [x] Darkness                [x] Ice  [] Local pressure       [] Menses (period)  [x] Massage                 [] Medications:          12. Headache worsening factors:   [x] Fatigue       [] Sneezing                [] Changes in Weather  [] Light           [x] Bending Over         [x] Stress  [] Noise          [] Ovulation                [] Multiple Sclerosis Flare-Up  [x] Smells        [] Menses                   [] Food   [] Coughing    [x] Alcohol        13. Number of caffeinated drinks per day: 0        14. Number of diet drinks per day:0        15. Have you seen any other [facility] within the last 3 years?  no     Please Check any Medications or Procedures tried/failed for Headache     AED Neuromodulators   MAOIs   Ergot Alkaloids     Acetazolamide (Diamox) []   Phenelzine (Nardil) []  Dihydroergotamine (Migranal) []    Carbamazepine (Tegretol) []  Tranylcypromine (Parnate) []  Ergotamine (Ergomar) []    Gabapentin (Neurontin) [x]  Antihistamine/Serotonergic   Triptans     Lacosamide (Vimpat) []  Cyproheptadine (Periactin) []  Almotriptan (Axert) []    Lamotrigine (Lamictal) []  Antihypertensives   Eletriptan (Relpax) [x]    Levatiracetam (Keppra) []  Atenolol (Tenormin) []  Frovatriptan (Frova) []    Oxcarbazepine (Trileptal) []  Bisoprostol (Zebeta) []  Naratriptan (Amerge) []    Phenobarbital []  Candesartan (Atacand) []  Rizatriptan (Maxalt) [x]        Nebivolol (Bystolic)   Sumatriptan (Imitrex) [x]    Levetiracetam (Keppra)   Cardeilol (Coreg) []  Zolmitriptan (Zomig) [x]    Phenytoin (Dilantin) []  Diltiazem (Cardizem) []  Treximet     Pregabalin (Lyrica) []  Lisinopril (Prinivil, Zestril) []  Combo Abortives     Primidone (Mysoline) []  Metoprolol (Toprol) []  BC Powder []    Tiagabine (Gabatril) []  Nadolol (Corgard) []  Butalbital and Acetaminophen (Bupap) []    Topiramate (Topamax)  (Trokendi) [x]  Nicardipine (Cardene) []      Vigabatrin (Sabril) []  Nimodipine (Nimotop) []  Butalbital, Acetaminophen, and caffeine (Fioricet) []    Valproic Acid (Depakote) (Divalproex Sodium) [x]  Propranolol (Inderal) [x]      Zonisamide (Zonegran) []  Telmisartan (Micardis) []  Butalbital, Aspirin, and caffeine (Fiorinal) []    Benzodiazepines   Timolol (Blocadren) []      Alprazolam (Xanax) []  Verapamil (Calan, Verelan) []  Butalbital, Caffeine, Acetaminophen, and Codeine (Fioricet with Codeine) [x]    Diazepam (Valium) []  NSAIDs       Lorazepam (Ativan) []  Acetaminophen (Tylenol) []      Clonazepam (Klonopin) []  Acetylsalicylic Acid (Aspirin) []  Butalbital, Caffeine, Aspirin, and Codeine  (Fiorinal with Codeine) []    Antidepressants   Diclofenac (Cambia) []      Amitriptyline (Elavil) []  Ibuprofen (Motrin) [x]      Desipramine (Norpramin) []  Indomethacin (Indocin) []   "Aspirin, Caffeine, and Acetaminophen (Excedrin) (Goodys) [x]    Doxepin (Sinequan) []  Ketoprofen (Orudis) []      Fluoxetine (Prozac) []  Ketorolac (Toradol) []  Acetaminophen, Dichloralphenazone, and Isometheptene (Midrin) []    Imipramine (Tofranil) []  Naproxen (Anaprox) (Aleve) [x]      Nortriptyline (Pamelor) []  Meclofenamic Acid (Meclomen) []      Venlafaxine (Effexor) []  Meloxicam (Mobic) []  Procedures     Desvenlafazine (Pristiq) []  Monoclonals   Greater occipital nerve block []    Duloxetine (Cymbalta) []  Eptinezumab []  Cervical, Thoracic, Lumbar radiofrequency ablation []    Trazadone []  Erenumab-aooe (Aimovig) []  Spenopalatine ganglion block []    Wellbutrin []  Galcanezumab (Emgality) []  Occipital neuro stimulation []    Protriptyline (Vivactil) []  Fremanazumab-vfrm (Ajovy)   Cervical, Thoracic, Lumbar, Caudal Epidural steroid injection [x]    Escitalopram (Lexapro) []  Other []  Sacroiliac joint steroid injection []    Celexa []  Memantine (Namenda) []  Transforaminal epidural steroid injection []        Botox []  Facet joint injections []        Baclofen (Lioresal) []  Cervical, Thoracic, Lumbar medial branch blocks []            Cefaly []            Gamma Core []            Iovera []            Transcranial Magnetic stimulation []                                   "  Medication Reconciliation:   Current Outpatient Medications   Medication Sig Dispense Refill    azelastine (ASTELIN) 137 mcg (0.1 %) nasal spray SMARTSI-2 Spray(s) Both Nares Every 12 Hours PRN      dextroamphetamine-amphetamine 30 mg Tab Take 1 tablet (30 mg total) by mouth 2 (two) times daily. For diagnosis code F90.9 60 tablet 0    fluticasone propionate (FLONASE) 50 mcg/actuation nasal spray 2 sprays by Each Nostril route once daily.      levocetirizine (XYZAL) 5 MG tablet Take 5 mg by mouth once daily.      montelukast (SINGULAIR) 10 mg tablet 1 tablet po QHS 90 tablet 3    NURTEC 75 mg odt Take 75 mg by mouth daily as " needed.      PREVIDENT 5000 ORTHO DEFENSE 1.1 % Pste use as directed      sildenafiL (VIAGRA) 100 MG tablet Take 1 tablet (100 mg total) by mouth daily as needed for Erectile Dysfunction. 90 tablet 1     No current facility-administered medications for this visit.     Review of patient's allergies indicates:   Allergen Reactions    Penicillins Rash       PMHx:  Past Medical History:   Diagnosis Date    a Congenital pulmonary valve stenosis     Was Cleared By Pediatric Cardiology; Did Not Require Any Corrective Sx    b Elevated BP W/O DX Of HTN     1/24/19 RXd A Low Salt Diet And Recommended He Stop OTC Creatine Muscle Building Supplements    b Family H/O Hypertension     Both Of Parents    k Erectile Dysfunction     10/12/15 Testosterone = Normal    k Left varicocele S/P Ligation     k Urinary Fequency     7/16/20 Ordered A U/A And UCx    l Chronic Recurrent Neck Pain     6/20/22 Referred To Dr. Keith Chu (PM&R); 6/20/22 C-Spine XRays = Unremarkable (See Report); Outpatient PT Has Helped A Bit In The Past; He Sees A Chiropracter For This; 09/2018 C-Spine MRI = Was Reportedly Unremarkable    m Family H/O Cerebral Aneurysm     6/20/22 Brain MRA = Ordered    m Migraines     7/16/20 And 7/26/18 Referred To Dr. Megan Nunes, And RXd Imitrex PRN    n Attention Deficit Disorder ###    7/10/17 Increased His 30 Mg Adderall Bid To Adderall 30 Mg Bid    n Hx Of Opiod Abuse In 2012 ###    Had A  And Was In A Tx Program Then    n Therapeutic Drug Monitoring     o Allergic rhinosinusitis     6/20/22 Referred To Dr. Liam Garcia    o H/O Nasal Septoplasty In 2013     This Helped With His Migraines    o Right Lower Eyelid Lesions     1/18/17 Referred To Dr. Andrews Dodd; HSV ??    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, RN   Social History     Socioeconomic History    Marital status: Single   Occupational History    Occupation: Works restaurant(, etc)     Employer: [employer]   Tobacco Use    Smoking status: Some Days     Types: Vaping with nicotine    Smokeless tobacco: Former    Tobacco comments:     chews tobacco   Substance and Sexual Activity    Alcohol use: Yes     Comment: soc    Drug use: No     Comment: past Hx abuse, denies any active use and in court ordered monitoring program, does not want to have any narcotic postop    Sexual activity: Yes     Partners: Female           Labs:   Reviewed in chart     Imaging:   Reviewed in chart       Other testing:  Reviewed in chart     Note: I have independently reviewed any/all imaging/labs/tests and agree with the report (s) as documented.  Any discrepancies will be as noted/demarcated by free text.  ANN DELGADO 9/13/2022                     ROS:   Review Of Systems (questions asked, positive or additions in BOLD)  Gen: Weight change, fatigue/malaise, pyrexia   HEENT: Tinnitus, headache,  blurred vision, eye pain, diplopia, photophobia,  nose bleeds, congestion, sore throat, jaw pain, scalp pain, neck stiffness   Card: Palpitations, CP   Pulm: SOB, cough   Vas: Easy bruising, easy bleeding   GI: N/V/D/C, incontinence, hematemesis, hematochezia    : incontinence, hematuria   Endocrine: Temp intolerance, polyuria, polydipsia   M/S: Neck pain, myalgia, back pain, joint pain, falls    Neuro: PER HPI   PSY: Memory loss, confusion, depression, anxiety, trouble in sleep, hallucinations          EXAM:   /89 (BP Location: Left arm, Patient Position: Sitting, BP Method: Medium (Automatic))   Pulse 67   Resp 17   Ht 5' 8" " (1.727 m)   Wt 78.5 kg (173 lb)   BMI 26.30 kg/m²    GEN:  NAD,   HEENT: NC/AT     NEURO:  Mental Status:  Awake, alert and appropriately oriented to time, place, and person.  Normal attention and concentration.  Speech is fluent and appropriate language function (I.e., comprehension)    Cranial Nerves:     Extraocular movements are intact and without nystagmus.  Visual fields are full to confrontation testing.   Facial movement is symmetric.   Hearing is normal. Uvula in midline. FROM of neck in all (6) directions, shoulder shrug symmetrical. Tongue in midline without fasiculation.     Motor:  Antigravity in all limbs  No drift  No resting tremor   Gait and Stance: Normal manner of stance and gait function testing.         This document has been electronically signed by Mr. Syed Stevens MPA, PA-C on 9/13/2022, I have personally typed this message using the EMR.       Dr Willie MD  was available during today's visit.     Personal Protective Equipment:    Personal Protective Equipment was used during this encounter including: surgical mask and non latex gloves.          CC: Jerry Navarro MD

## 2022-10-17 ENCOUNTER — TELEPHONE (OUTPATIENT)
Dept: NEUROLOGY | Facility: CLINIC | Age: 32
End: 2022-10-17
Payer: COMMERCIAL

## 2022-10-17 NOTE — TELEPHONE ENCOUNTER
----- Message from Erlin Garg sent at 10/17/2022  2:19 PM CDT -----  Regarding: reschedule botox  Type:  Reschedule Botox    Caller is requesting a BOTOX appointment.      Name of Caller:  liseth    When is the first available appointment?  Dept Book    Procedure:  Botox    Best Call Back Number:  [phone]     Additional Information:  pt is avail 10/19 (late afternoon), 10/21, 10/26 or 10/27 b/c is pt is not working on those days.

## 2022-10-21 ENCOUNTER — PROCEDURE VISIT (OUTPATIENT)
Dept: NEUROLOGY | Facility: CLINIC | Age: 32
End: 2022-10-21
Payer: COMMERCIAL

## 2022-10-21 VITALS
WEIGHT: 175 LBS | DIASTOLIC BLOOD PRESSURE: 87 MMHG | HEART RATE: 86 BPM | HEIGHT: 68 IN | SYSTOLIC BLOOD PRESSURE: 133 MMHG | RESPIRATION RATE: 17 BRPM | BODY MASS INDEX: 26.52 KG/M2

## 2022-10-21 DIAGNOSIS — G43.719 INTRACTABLE CHRONIC MIGRAINE WITHOUT AURA AND WITHOUT STATUS MIGRAINOSUS: Primary | ICD-10-CM

## 2022-10-21 PROCEDURE — 64615 PR CHEMODENERVATION OF MUSCLE FOR CHRONIC MIGRAINE: ICD-10-PCS | Mod: S$GLB,,, | Performed by: PHYSICIAN ASSISTANT

## 2022-10-21 PROCEDURE — 64615 CHEMODENERV MUSC MIGRAINE: CPT | Mod: S$GLB,,, | Performed by: PHYSICIAN ASSISTANT

## 2022-10-21 NOTE — PROCEDURES
[facility]  Headache [facility]  [address] [place] [address]  Phone:[phone]  Fax: [phone]  Botox Visit, #2    Chief Complaint   Patient presents with    Botulinum Toxin Injection         A/P:        ICD-10-CM ICD-9-CM   1. Intractable chronic migraine without aura and without status migrainosus  G43.719 346.71     Botox for migraine    PROCEDURE NOTE:  BOTOX injection is indicated for the prophylaxis of headaches in adult patients with chronic  migraine. Patient meets indications for BOTOX therapy.  Potential risks and benefits were reviewed. Side effects including, but not limited to, potential  systemic allergic reactions of the anaphylactic type as well as local injection site reactions of  blepharoptosis, diplopia, infection, bleeding, pain, redness and bruising were reviewed. The  potential for headaches and/or neck pain post procedure were reviewed.  The patient's questions were answered. The patient signed a consent form. Patient  understands that depending on their insurance carrier, there may be a copay for this treatment.  BOTOX was reconstituted using   two 100 unit vials and diluted with 4 mL of sterile saline.  BOTOX was injected as per the PREEMPT trial injection paradigm with dose administered as 5  unit intramuscular (IM) injections per site using a sterile, 30-gauge 0.5 inch needle as follows:  Muscle Dose, # of Sites   10 units divided in 2 sites  Procerus 5 units in 1 site  Frontalis 20 units divided in 4 sites  Temporalis 40 units divided in 8 sites  Occipitalis 30 units divided in 6 sites  Cervical paraspinal 20 units divided in 4 sites  Trapezius 30 units divided in 6 sites  Each site was cleaned with alcohol prior to injection. A total dose of 155 units were injected. 45  units were discarded/wasted.  The patient tolerated the procedure well with no immediate complications.  MEDICATION INFO:  NDC 2245-5765-24   Lot # [id number]  Exp  11/2024         Follow up in 3 months for repeat injection       Syed Stevens MPA, PA-C  Attending available-Dr Willie MD         Personal Protective Equipment:    Personal Protective Equipment was used during this encounter including;   surgical mask and non latex gloves.     10/21/2022      CC: Jerry Navarro MD

## 2023-01-13 ENCOUNTER — PROCEDURE VISIT (OUTPATIENT)
Dept: NEUROLOGY | Facility: CLINIC | Age: 33
End: 2023-01-13
Payer: COMMERCIAL

## 2023-01-13 VITALS
DIASTOLIC BLOOD PRESSURE: 87 MMHG | WEIGHT: 175 LBS | RESPIRATION RATE: 17 BRPM | HEART RATE: 80 BPM | HEIGHT: 68 IN | BODY MASS INDEX: 26.52 KG/M2 | SYSTOLIC BLOOD PRESSURE: 135 MMHG

## 2023-01-13 DIAGNOSIS — G43.719 INTRACTABLE CHRONIC MIGRAINE WITHOUT AURA AND WITHOUT STATUS MIGRAINOSUS: Primary | ICD-10-CM

## 2023-01-13 PROCEDURE — 64615 PR CHEMODENERVATION OF MUSCLE FOR CHRONIC MIGRAINE: ICD-10-PCS | Mod: S$GLB,,, | Performed by: PHYSICIAN ASSISTANT

## 2023-01-13 PROCEDURE — 64615 CHEMODENERV MUSC MIGRAINE: CPT | Mod: S$GLB,,, | Performed by: PHYSICIAN ASSISTANT

## 2023-01-13 NOTE — PROCEDURES
[facility]  [facility]ache Clinic  [address] [place] [address]  Phone:[phone]  Fax: [phone]  Botox Visit, #3    Chief Complaint   Patient presents with    Botulinum Toxin Injection         A/P:        ICD-10-CM ICD-9-CM   1. Intractable chronic migraine without aura and without status migrainosus  G43.719 346.71     Botox for migraine    PROCEDURE NOTE:  BOTOX injection is indicated for the prophylaxis of headaches in adult patients with chronic  migraine. Patient meets indications for BOTOX therapy.  Potential risks and benefits were reviewed. Side effects including, but not limited to, potential  systemic allergic reactions of the anaphylactic type as well as local injection site reactions of  blepharoptosis, diplopia, infection, bleeding, pain, redness and bruising were reviewed. The  potential for headaches and/or neck pain post procedure were reviewed.  The patient's questions were answered. The patient signed a consent form. Patient  understands that depending on their insurance carrier, there may be a copay for this treatment.  BOTOX was reconstituted using   two 100 unit vials and diluted with 4 mL of sterile saline.  BOTOX was injected as per the PREEMPT trial injection paradigm with dose administered as 5  unit intramuscular (IM) injections per site using a sterile, 30-gauge 0.5 inch needle as follows:  Muscle Dose, # of Sites   10 units divided in 2 sites  Procerus 5 units in 1 site  Frontalis 20 units divided in 4 sites  Temporalis 40 units divided in 8 sites  Occipitalis 30 units divided in 6 sites  Cervical paraspinal 20 units divided in 4 sites  Trapezius 30 units divided in 6 sites  Each site was cleaned with alcohol prior to injection. A total dose of 155 units were injected. 45  units were discarded/wasted.  The patient tolerated the procedure well with no immediate complications.  MEDICATION INFO:  NDC 7164-7612-07   Lot # [id number]  Exp  03/2025         Follow up in 3 months for repeat injection       Syed Stevens MPA, PA-C  Attending available-Dr Willie MD         Personal Protective Equipment:    Personal Protective Equipment was used during this encounter including;   surgical mask and non latex gloves.     01/13/2023      CC: Jerry Navarro MD

## 2023-02-01 PROBLEM — J30.9 ALLERGIC SINUSITIS: Status: ACTIVE | Noted: 2023-02-01

## 2023-02-28 DIAGNOSIS — G43.719 INTRACTABLE CHRONIC MIGRAINE WITHOUT AURA AND WITHOUT STATUS MIGRAINOSUS: Primary | ICD-10-CM

## 2023-02-28 RX ORDER — RIMEGEPANT SULFATE 75 MG/75MG
75 TABLET, ORALLY DISINTEGRATING ORAL DAILY PRN
Qty: 8 TABLET | Refills: 5 | Status: SHIPPED | OUTPATIENT
Start: 2023-02-28 | End: 2023-03-06 | Stop reason: SDUPTHER

## 2023-02-28 NOTE — TELEPHONE ENCOUNTER
----- Message from Jeffrey Childs sent at 2/28/2023 12:27 PM CST -----  Regarding: refill  Type:  RX Refill Request    Who Called:  Lance    Refill or New Rx:  refill    RX Name and Strength:      NURTEC 75 mg odt   6/24/2022    Sig - Route: Take 75 mg by mouth daily as needed. - Oral   Class: Historical Med       How is the patient currently taking it? (ex. 1XDay):  see above    Is this a 30 day or 90 day RX:  see above    Preferred Pharmacy with phone number:    [id number] - [place] - [address] [facility] [address]  [address] [address]  Phone: [phone] Fax: [phone]    Local or Mail Order:  Local    Ordering Provider:  CARLOS ALBERTO Stevens    Best Call Back Number:  [phone] (home)     Additional Information:  na

## 2023-03-03 ENCOUNTER — PATIENT MESSAGE (OUTPATIENT)
Dept: NEUROLOGY | Facility: CLINIC | Age: 33
End: 2023-03-03
Payer: COMMERCIAL

## 2023-03-03 DIAGNOSIS — G43.719 INTRACTABLE CHRONIC MIGRAINE WITHOUT AURA AND WITHOUT STATUS MIGRAINOSUS: ICD-10-CM

## 2023-03-06 RX ORDER — RIMEGEPANT SULFATE 75 MG/75MG
TABLET, ORALLY DISINTEGRATING ORAL
Qty: 8 TABLET | Refills: 5 | Status: SHIPPED | OUTPATIENT
Start: 2023-03-06 | End: 2023-11-30 | Stop reason: SDUPTHER

## 2023-03-07 ENCOUNTER — TELEPHONE (OUTPATIENT)
Dept: NEUROLOGY | Facility: CLINIC | Age: 33
End: 2023-03-07
Payer: COMMERCIAL

## 2023-03-07 NOTE — TELEPHONE ENCOUNTER
Contacted patient in regards to his 4/11/2023 appointment with Syed Stevens.  Patient was informed that on 4/11/2023, his appointment has to be reschedule for 4/18/2023.  Patient agreed and confirmed.

## 2023-03-08 ENCOUNTER — TELEPHONE (OUTPATIENT)
Dept: PHARMACY | Facility: CLINIC | Age: 33
End: 2023-03-08
Payer: COMMERCIAL

## 2023-03-20 PROBLEM — L73.9 FOLLICULITIS: Status: ACTIVE | Noted: 2023-03-20

## 2023-03-20 PROBLEM — K64.4 EXTERNAL HEMORRHOID: Status: ACTIVE | Noted: 2023-03-20

## 2023-04-18 ENCOUNTER — PROCEDURE VISIT (OUTPATIENT)
Dept: NEUROLOGY | Facility: CLINIC | Age: 33
End: 2023-04-18
Payer: COMMERCIAL

## 2023-04-18 VITALS
BODY MASS INDEX: 26.37 KG/M2 | WEIGHT: 174 LBS | SYSTOLIC BLOOD PRESSURE: 138 MMHG | HEART RATE: 78 BPM | RESPIRATION RATE: 17 BRPM | HEIGHT: 68 IN | DIASTOLIC BLOOD PRESSURE: 81 MMHG

## 2023-04-18 DIAGNOSIS — G43.719 INTRACTABLE CHRONIC MIGRAINE WITHOUT AURA AND WITHOUT STATUS MIGRAINOSUS: Primary | ICD-10-CM

## 2023-04-18 PROCEDURE — 64615 CHEMODENERV MUSC MIGRAINE: CPT | Mod: S$GLB,,, | Performed by: PHYSICIAN ASSISTANT

## 2023-04-18 PROCEDURE — 64615 PR CHEMODENERVATION OF MUSCLE FOR CHRONIC MIGRAINE: ICD-10-PCS | Mod: S$GLB,,, | Performed by: PHYSICIAN ASSISTANT

## 2023-04-18 NOTE — PROCEDURES
Procedures  [facility]  [facility]  [address] [place] [address]  Phone:[phone]  Fax: [phone]  Botox Visit, #4    Chief Complaint   Patient presents with    Botulinum Toxin Injection         A/P:        ICD-10-CM ICD-9-CM   1. Intractable chronic migraine without aura and without status migrainosus  G43.719 346.71     Botox for migraine    PROCEDURE NOTE:  BOTOX injection is indicated for the prophylaxis of headaches in adult patients with chronic  migraine. Patient meets indications for BOTOX therapy.  Potential risks and benefits were reviewed. Side effects including, but not limited to, potential  systemic allergic reactions of the anaphylactic type as well as local injection site reactions of  blepharoptosis, diplopia, infection, bleeding, pain, redness and bruising were reviewed. The  potential for headaches and/or neck pain post procedure were reviewed.  The patient's questions were answered. The patient signed a consent form. Patient  understands that depending on their insurance carrier, there may be a copay for this treatment.  BOTOX was reconstituted using   two 100 unit vials and diluted with 4 mL of sterile saline.  BOTOX was injected as per the PREEMPT trial injection paradigm with dose administered as 5  unit intramuscular (IM) injections per site using a sterile, 30-gauge 0.5 inch needle as follows:  Muscle Dose, # of Sites   10 units divided in 2 sites  Procerus 5 units in 1 site  Frontalis 20 units divided in 4 sites  Temporalis 40 units divided in 8 sites  Occipitalis 30 units divided in 6 sites  Cervical paraspinal 20 units divided in 4 sites  Trapezius 30 units divided in 6 sites  Each site was cleaned with alcohol prior to injection. A total dose of 155 units were injected. 45  units were discarded/wasted.  The patient tolerated the procedure well with no immediate complications.  MEDICATION INFO:  NDC 4407-4920-17   Lot #  [id number]  Exp 09/2025         Follow up in 3 months for repeat injection       Syed Stevens MPA, PA-C  Attending available-Dr Willie MD         Personal Protective Equipment:    Personal Protective Equipment was used during this encounter including;   surgical mask and non latex gloves.     04/18/2023      CC: Jerry Navarro MD

## 2023-06-01 ENCOUNTER — TELEPHONE (OUTPATIENT)
Dept: NEUROLOGY | Facility: CLINIC | Age: 33
End: 2023-06-01
Payer: COMMERCIAL

## 2023-06-01 NOTE — TELEPHONE ENCOUNTER
----- Message from Jose Juan Glasgow sent at 6/1/2023  3:51 PM CDT -----  Type: Needs Medical Advice  Who Called:  pt  Best Call Back Number: [phone]  Additional Information: pt is calling the office to reschedule his Botox appt to the 19th or 20th instead of 7/18/23. Please call back to advise. Thanks!

## 2023-07-20 ENCOUNTER — PROCEDURE VISIT (OUTPATIENT)
Dept: NEUROLOGY | Facility: CLINIC | Age: 33
End: 2023-07-20
Payer: COMMERCIAL

## 2023-07-20 VITALS
HEART RATE: 73 BPM | RESPIRATION RATE: 17 BRPM | SYSTOLIC BLOOD PRESSURE: 136 MMHG | DIASTOLIC BLOOD PRESSURE: 83 MMHG | BODY MASS INDEX: 26.23 KG/M2 | WEIGHT: 173.06 LBS | HEIGHT: 68 IN

## 2023-07-20 DIAGNOSIS — G43.719 INTRACTABLE CHRONIC MIGRAINE WITHOUT AURA AND WITHOUT STATUS MIGRAINOSUS: Primary | ICD-10-CM

## 2023-07-20 PROCEDURE — 64615 PR CHEMODENERVATION OF MUSCLE FOR CHRONIC MIGRAINE: ICD-10-PCS | Mod: S$GLB,,, | Performed by: PHYSICIAN ASSISTANT

## 2023-07-20 PROCEDURE — 64615 CHEMODENERV MUSC MIGRAINE: CPT | Mod: S$GLB,,, | Performed by: PHYSICIAN ASSISTANT

## 2023-07-20 NOTE — PROCEDURES
Procedures  [facility]  [facility]  [address] [place] [address]  Phone:[phone]  Fax: [phone]  Botox Visit, #5    Chief Complaint   Patient presents with    Botulinum Toxin Injection         A/P:        ICD-10-CM ICD-9-CM   1. Intractable chronic migraine without aura and without status migrainosus  G43.719 346.71     Botox for migraine    Historically: Patient meets the criteria for chronic migraine. In summary, He has headaches/migraines >15 days per month  and last >4 hours if untreated. Specifics of duration, frequency and strength are listed in office visit HPI's.  This pattern has continued for >3 months.  He has failed at least three preventive medications (full list of medications is listed in office notes of Therapies tried in past)  I have therefore recommended a trial of Botox via the PREEMPT protocol for migraine prophylaxis.We schedule regular follow up intervals to check on status.     PROCEDURE NOTE:  BOTOX injection is indicated for the prophylaxis of headaches in adult patients with chronic  migraine. Patient meets indications for BOTOX therapy.  Potential risks and benefits were reviewed. Side effects including, but not limited to, potential  systemic allergic reactions of the anaphylactic type as well as local injection site reactions of  blepharoptosis, diplopia, infection, bleeding, pain, redness and bruising were reviewed. The  potential for headaches and/or neck pain post procedure were reviewed.  The patient's questions were answered. The patient signed a consent form. Patient  understands that depending on their insurance carrier, there may be a copay for this treatment.  BOTOX was reconstituted using  two 100 unit vials and diluted with 4 mL of sterile saline.  BOTOX was injected as per the PREEMPT trial injection paradigm with dose administered as 5  unit intramuscular (IM) injections per site using a sterile, 30-gauge 0.5 inch  "needle as follows:  Muscle Dose, # of Sites   10 units divided in 2 sites  Procerus 5 units in 1 site  Frontalis 20 units divided in 4 sites  Temporalis 40 units divided in 8 sites  Occipitalis 30 units divided in 6 sites  Cervical paraspinal 20 units divided in 4 sites  Trapezius 30 units divided in 6 sites  Each site was cleaned with alcohol prior to injection. A total dose of 155 units were injected. 45  units were discarded/wasted.      The patient tolerated the procedure well with no immediate complications.  MEDICATION INFO:  NDC 9898-8149-17   Lot # [id number]  Exp 11/2025      As aside:  >50% relief from the injections  Has been having some migraines "here or there" and discusses that he waits for his HA to get bad before taking medicine ("which does work for him."). I suggested taking medicine at onset HA as opposed to waiting. He agreed.     Follow up in 3 months for repeat injection, we also have interspersed office visits scheduled to ensure efficacy of botox sessions/check on patient.       Syed Stevens MPA, PA-C  Attending available-Dr Willie MD           Personal Protective Equipment:    Personal Protective Equipment was used during this encounter including;   non latex gloves.     07/20/2023 9:46 AM    CC: Jerry Navarro MD        "

## 2023-10-19 ENCOUNTER — PROCEDURE VISIT (OUTPATIENT)
Dept: NEUROLOGY | Facility: CLINIC | Age: 33
End: 2023-10-19
Payer: COMMERCIAL

## 2023-10-19 VITALS
HEART RATE: 79 BPM | DIASTOLIC BLOOD PRESSURE: 81 MMHG | SYSTOLIC BLOOD PRESSURE: 135 MMHG | HEIGHT: 68 IN | BODY MASS INDEX: 26.47 KG/M2 | RESPIRATION RATE: 17 BRPM | WEIGHT: 174.63 LBS | TEMPERATURE: 97 F

## 2023-10-19 DIAGNOSIS — G43.719 INTRACTABLE CHRONIC MIGRAINE WITHOUT AURA AND WITHOUT STATUS MIGRAINOSUS: Primary | ICD-10-CM

## 2023-10-19 PROCEDURE — 64615 CHEMODENERV MUSC MIGRAINE: CPT | Mod: S$GLB,,, | Performed by: PHYSICIAN ASSISTANT

## 2023-10-19 PROCEDURE — 64615 PR CHEMODENERVATION OF MUSCLE FOR CHRONIC MIGRAINE: ICD-10-PCS | Mod: S$GLB,,, | Performed by: PHYSICIAN ASSISTANT

## 2023-10-19 NOTE — PROCEDURES
Procedures  [facility]  [facility]  [address] [facility] [address] [place] [address]  Phone:[phone]  Fax: [phone]  Botox Visit, #6    Chief Complaint   Patient presents with    Botulinum Toxin Injection         A/P:        ICD-10-CM ICD-9-CM   1. Intractable chronic migraine without aura and without status migrainosus  G43.719 346.71     Botox for migraine    Historically: Patient meets the criteria for chronic migraine. In summary, He has headaches/migraines >15 days per month  and last >4 hours if untreated. Specifics of duration, frequency and strength are listed in office visit HPI's.  This pattern has continued for >3 months.  He has failed at least three preventive medications (full list of medications is listed in office notes of Therapies tried in past)  I have therefore recommended a trial of Botox via the PREEMPT protocol for migraine prophylaxis.We schedule regular follow up intervals to check on status.     PROCEDURE NOTE:  BOTOX injection is indicated for the prophylaxis of headaches in adult patients with chronic  migraine. Patient meets indications for BOTOX therapy.  Potential risks and benefits were reviewed. Side effects including, but not limited to, potential  systemic allergic reactions of the anaphylactic type as well as local injection site reactions of  blepharoptosis, diplopia, infection, bleeding, pain, redness and bruising were reviewed. The  potential for headaches and/or neck pain post procedure were reviewed.  The patient's questions were answered. The patient signed a consent form. Patient  understands that depending on their insurance carrier, there may be a copay for this treatment.  BOTOX was reconstituted using  two 100 unit vials and diluted with 4 mL of sterile saline.  BOTOX was injected as per the PREEMPT trial injection paradigm with dose administered as 5  unit intramuscular (IM) injections per site using a sterile, 30-gauge 0.5 inch  needle as follows:  Muscle Dose, # of Sites   10 units divided in 2 sites  Procerus 5 units in 1 site  Frontalis 20 units divided in 4 sites  Temporalis 40 units divided in 8 sites  Occipitalis 30 units divided in 6 sites  Cervical paraspinal 20 units divided in 4 sites  Trapezius 30 units divided in 6 sites  Each site was cleaned with alcohol prior to injection. A total dose of 155 units were injected. 45  units were discarded/wasted.      The patient tolerated the procedure well with no immediate complications.  MEDICATION INFO:  NDC 1463-5075-37   Lot #  [id number]  Exp 12/2025      As aside:  >50% relief from the injections       Follow up in 3 months for repeat injection, we also have interspersed office visits scheduled to ensure efficacy of botox sessions/check on patient.       Syed Stevens MPA, PA-C  Attending available-Dr Willie MD           Personal Protective Equipment:    Personal Protective Equipment was used during this encounter including;   non latex gloves.     10/19/2023      CC: Jerry Navarro MD

## 2023-11-29 ENCOUNTER — TELEPHONE (OUTPATIENT)
Dept: NEUROLOGY | Facility: CLINIC | Age: 33
End: 2023-11-29
Payer: COMMERCIAL

## 2023-11-29 NOTE — TELEPHONE ENCOUNTER
Spoke with patient. Informed him he should be seen in clinic at least once a year to continue with treatments and prescriptions.

## 2023-11-29 NOTE — TELEPHONE ENCOUNTER
----- Message from Karin Gross sent at 11/29/2023  2:52 PM CST -----  Regarding: Needs Medical Advice about tomorrow morning's appointment  Contact: patient at [phone]  Type: Needs Medical Advice  Who Called:  patient at [phone]    Additional Information: patient wants to know if appointment tomorrow is required for anything. Please call and advise. Thank you

## 2023-11-30 ENCOUNTER — OFFICE VISIT (OUTPATIENT)
Dept: NEUROLOGY | Facility: CLINIC | Age: 33
End: 2023-11-30
Payer: COMMERCIAL

## 2023-11-30 VITALS
RESPIRATION RATE: 17 BRPM | HEART RATE: 99 BPM | HEIGHT: 68 IN | TEMPERATURE: 98 F | SYSTOLIC BLOOD PRESSURE: 138 MMHG | DIASTOLIC BLOOD PRESSURE: 86 MMHG | WEIGHT: 190.81 LBS | BODY MASS INDEX: 28.92 KG/M2

## 2023-11-30 DIAGNOSIS — G43.719 INTRACTABLE CHRONIC MIGRAINE WITHOUT AURA AND WITHOUT STATUS MIGRAINOSUS: ICD-10-CM

## 2023-11-30 PROCEDURE — 3075F PR MOST RECENT SYSTOLIC BLOOD PRESS GE 130-139MM HG: ICD-10-PCS | Mod: CPTII,S$GLB,, | Performed by: PHYSICIAN ASSISTANT

## 2023-11-30 PROCEDURE — 1159F MED LIST DOCD IN RCRD: CPT | Mod: CPTII,S$GLB,, | Performed by: PHYSICIAN ASSISTANT

## 2023-11-30 PROCEDURE — 1160F RVW MEDS BY RX/DR IN RCRD: CPT | Mod: CPTII,S$GLB,, | Performed by: PHYSICIAN ASSISTANT

## 2023-11-30 PROCEDURE — 3075F SYST BP GE 130 - 139MM HG: CPT | Mod: CPTII,S$GLB,, | Performed by: PHYSICIAN ASSISTANT

## 2023-11-30 PROCEDURE — 3079F PR MOST RECENT DIASTOLIC BLOOD PRESSURE 80-89 MM HG: ICD-10-PCS | Mod: CPTII,S$GLB,, | Performed by: PHYSICIAN ASSISTANT

## 2023-11-30 PROCEDURE — 3079F DIAST BP 80-89 MM HG: CPT | Mod: CPTII,S$GLB,, | Performed by: PHYSICIAN ASSISTANT

## 2023-11-30 PROCEDURE — 99213 OFFICE O/P EST LOW 20 MIN: CPT | Mod: S$GLB,,, | Performed by: PHYSICIAN ASSISTANT

## 2023-11-30 PROCEDURE — 1160F PR REVIEW ALL MEDS BY PRESCRIBER/CLIN PHARMACIST DOCUMENTED: ICD-10-PCS | Mod: CPTII,S$GLB,, | Performed by: PHYSICIAN ASSISTANT

## 2023-11-30 PROCEDURE — 99213 PR OFFICE/OUTPT VISIT, EST, LEVL III, 20-29 MIN: ICD-10-PCS | Mod: S$GLB,,, | Performed by: PHYSICIAN ASSISTANT

## 2023-11-30 PROCEDURE — 3044F HG A1C LEVEL LT 7.0%: CPT | Mod: CPTII,S$GLB,, | Performed by: PHYSICIAN ASSISTANT

## 2023-11-30 PROCEDURE — 1159F PR MEDICATION LIST DOCUMENTED IN MEDICAL RECORD: ICD-10-PCS | Mod: CPTII,S$GLB,, | Performed by: PHYSICIAN ASSISTANT

## 2023-11-30 PROCEDURE — 3008F PR BODY MASS INDEX (BMI) DOCUMENTED: ICD-10-PCS | Mod: CPTII,S$GLB,, | Performed by: PHYSICIAN ASSISTANT

## 2023-11-30 PROCEDURE — 3044F PR MOST RECENT HEMOGLOBIN A1C LEVEL <7.0%: ICD-10-PCS | Mod: CPTII,S$GLB,, | Performed by: PHYSICIAN ASSISTANT

## 2023-11-30 PROCEDURE — 99999 PR PBB SHADOW E&M-EST. PATIENT-LVL IV: ICD-10-PCS | Mod: PBBFAC,,, | Performed by: PHYSICIAN ASSISTANT

## 2023-11-30 PROCEDURE — 99999 PR PBB SHADOW E&M-EST. PATIENT-LVL IV: CPT | Mod: PBBFAC,,, | Performed by: PHYSICIAN ASSISTANT

## 2023-11-30 PROCEDURE — 3008F BODY MASS INDEX DOCD: CPT | Mod: CPTII,S$GLB,, | Performed by: PHYSICIAN ASSISTANT

## 2023-11-30 RX ORDER — RIMEGEPANT SULFATE 75 MG/75MG
TABLET, ORALLY DISINTEGRATING ORAL
Qty: 8 TABLET | Refills: 5 | Status: SHIPPED | OUTPATIENT
Start: 2023-11-30

## 2023-11-30 NOTE — PROGRESS NOTES
[facility]  [address] [place] [address]  Phone:[phone]  Fax: [phone]  Follow up visit    Patient Name: Lance Andrade  : 1990  MRN:  [id number]  Today: 2023   LOV: 10/19/2023 for botox #6  chief complaint: Headache      PCP: Jerry Navarro MD.    Assessment:   Lance Andrade is a 33 y.o. with a PMHx of: substance abuse (per chart review, including opiate), migraines, ADHD and neck pain  whom presents solo in follow up. HA appear to be chronic migrainous resistant to treatment. Botox has improved his HA.     Review:    ICD-10-CM ICD-9-CM   1. Intractable chronic migraine without aura and without status migrainosus  G43.719 346.71             Plan:   Continue to track HA              -if HA change in quality/nature, will get updated imaging study     For HA Prevention:  Continue on to botox # 7    For HA :  Nurtec refilled     To break up Headaches:     Can consider nerve blocks in future (did not discuss at today's appt)     Other:   N/a         All test results as well as any necessary instructions were reviewed and discussed with patient.    Review:  Orders Placed This Encounter    NURTEC 75 mg odt           Patient to return to PCP/other specialists for all other problems  Patient to continue on all medications as Rx'd  Office notes available online   RTO- botox 7  The patient indicates understanding of these issues and agrees to the plan.    HPI:   Lance Andrade is a 33 y.o.right handed, male with a PMHx of: substance abuse (per chart review, including opiate), migraines, ADHD and neck pain  whom presents solo in follow up.     At last visit: botox #6 given, has nurtec for HA .   Presents late for appt today  1-2 migraines at most in past month, last migraine was 2 weeks ago  Nurtec is abortive when he uses it  No side effects from botox   Feeling great overall      From previous visits:, botox 1  "given. Has nurtec for HA . Referral to back and spine center.   3 HD since last botox visit (historically was 30 HD/30)   No side effects from botox  Last HA was this past week, alcohol possibly triggered   Nurtec work well   Pain along LEFT hemicrania   Average duration: 2 -4 hours  Pain is 8/10       From previous visits:  Presents late to appt (>15 mins), still seen.     nurtec, maxalt and prednisone written.   Still having daily HA (30 HD/30)  LEFT side of the head  Average pain: 7-8/10  Pain lasts on average a few hours (takes something near daily for them), if he didn't take something would be ALL day   No vision changes  Steroids didn't make HA go away but may have "eased" some of the pain  maxalt and nurtec take HA away for the day   Last COHEN yesterday   Has stopped the maxalt  "I am going through nurtec"  Feels his neck pain and his sinus problems are causing a lot of his HA   No new weakness, no new falls  No other new concerns   No HA at present     At last visit: started on verapamil and nurtec.   Self stopped verapamil  Felt nurtec helped-had samples of this (years ago)    HA came back 2-3 months ago-no trigger identified   Location: left hemicrania  Severity: moderate-severe  Duration:2-3 hours as he takes something for it (OTC daily), or if too bad will take imitrex or muscle relaxants. The imitrex at 50 mg isn't very effective. Will also take large amounts of caffeine to break up the HA   Frequency:daily (30 HD/30)    Associated factors (bolded positive) WITH HA ( or migraine): Nausea, vomiting, photophobia, phonophobia, tinnitus, scalp pain, vision loss, diplopia, scintillations, eye pain, jaw pain, weakness?    Tried:caffeine, tylenol, imitrex, muscle relaxers   Triggers (in bold): neck pain,  stress, lack of sleep, too much caffeine, too little caffeine, weather change, seasonal change, strong odours, bright lights, sunlight  Last HA: 2 night ago   Positives in bold: Hx of Kidney Stones, " "asthma, GI bleed, osteoporosis, CAD/MI, CVA/TIA, DM  <---denies  Imaging on file: none of brain in past 3 years at [facility]   Therapies tried in past: (failures to be marked, if known---why did it fail?)  Topamax  Depakote  Propranolol  treximet  imitrex  maxalt  relpax.   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 Positive FHX of migraine affecting his mother. The frequency of the attacks is quite variable, ranging from daily to periods of headache freedom lasting weeks. He reports the headaches "come in clusters." He was taking analgesics, naproxen, ibuprofen, goodie powders QID on a daily basis. His own reading made him aware of Medication Overuse Headache. He tapered himself off and gradually got better. He reports very severe pain in the process but he endured it and is now a lot better.He has tried a number of preventives including Topamax, Depakote, Propranolol. For acute treatment he has tried triptans, all associated with side effects, including treximet, imitrex, maxalt, relpax. Fioricet only partially effective. Per records, he has history of substance abuse, and was monitored for months in [id number].    Headache questionnaire     1. When did your Headaches start?               As long as I can remember        2. Where are your headaches located?              Temple/side mostly left        3. Your headache's characteristics:              Excruciating, Pressure, Throbbing, Pounding        4. How long does the headache last?              Minutes hours        5. How often does the headache occur?              daily, weekly and cluster        6. Are your headaches preceded or accompanied by other symptoms? yes              If yes, please describe.  Sinus pressure, neck pain         7. Does the headache awaken " you at night? yes              If so, how often? Most nights when I am having headaches           8. Please cristina the word that best describes your headache's intensity:               severe        9. Using a scale of 1 through 10, with 0 = no pain and 10 = the worst pain:              What score is your headache now? 0              What score is your headache at its worst? 10              What score is your headache at its best? 0           10. Possible associated headache symptoms:  [x]  Sensitivity to light              [x] Dizziness                [x] Nasal or sinus pressure/ pain          [x] Sensitivity to noise             [] Vertigo                    [x] Problems with concentration  [x] Sensitivity to smells           [] Ringing in ears       [x] Problems with memory                    [x] Blurred vision                     [x] Irritability                 [x] Problems with task completion   [] Double vision                     [x] Anger                      [x]  Problems with relaxation  [x] Loss of appetite                  [x] Anxiety                   [x] Neck tightness, Neck pain  [x] Nausea                               [x] Nasal congestion  [] Vomiting                                     11. Headache improving factors:  [] Sleep                      [x] Heat  [x] Darkness                [x] Ice  [] Local pressure       [] Menses (period)  [x] Massage                 [] Medications:          12. Headache worsening factors:   [x] Fatigue       [] Sneezing                [] Changes in Weather  [] Light           [x] Bending Over         [x] Stress  [] Noise          [] Ovulation                [] Multiple Sclerosis Flare-Up  [x] Smells        [] Menses                   [] Food   [] Coughing    [x] Alcohol        13. Number of caffeinated drinks per day: 0        14. Number of diet drinks per day:0        15. Have you seen any other [facility] within the last 3 years?  no     Please Check any  Medications or Procedures tried/failed for Headache     AED Neuromodulators   MAOIs   Ergot Alkaloids     Acetazolamide (Diamox) []  Phenelzine (Nardil) []  Dihydroergotamine (Migranal) []    Carbamazepine (Tegretol) []  Tranylcypromine (Parnate) []  Ergotamine (Ergomar) []    Gabapentin (Neurontin) [x]  Antihistamine/Serotonergic   Triptans     Lacosamide (Vimpat) []  Cyproheptadine (Periactin) []  Almotriptan (Axert) []    Lamotrigine (Lamictal) []  Antihypertensives   Eletriptan (Relpax) [x]    Levatiracetam (Keppra) []  Atenolol (Tenormin) []  Frovatriptan (Frova) []    Oxcarbazepine (Trileptal) []  Bisoprostol (Zebeta) []  Naratriptan (Amerge) []    Phenobarbital []  Candesartan (Atacand) []  Rizatriptan (Maxalt) [x]        Nebivolol (Bystolic)   Sumatriptan (Imitrex) [x]    Levetiracetam (Keppra)   Cardeilol (Coreg) []  Zolmitriptan (Zomig) [x]    Phenytoin (Dilantin) []  Diltiazem (Cardizem) []  Treximet     Pregabalin (Lyrica) []  Lisinopril (Prinivil, Zestril) []  Combo Abortives     Primidone (Mysoline) []  Metoprolol (Toprol) []  BC Powder []    Tiagabine (Gabatril) []  Nadolol (Corgard) []  Butalbital and Acetaminophen (Bupap) []    Topiramate (Topamax)  (Trokendi) [x]  Nicardipine (Cardene) []      Vigabatrin (Sabril) []  Nimodipine (Nimotop) []  Butalbital, Acetaminophen, and caffeine (Fioricet) []    Valproic Acid (Depakote) (Divalproex Sodium) [x]  Propranolol (Inderal) [x]      Zonisamide (Zonegran) []  Telmisartan (Micardis) []  Butalbital, Aspirin, and caffeine (Fiorinal) []    Benzodiazepines   Timolol (Blocadren) []      Alprazolam (Xanax) []  Verapamil (Calan, Verelan) []  Butalbital, Caffeine, Acetaminophen, and Codeine (Fioricet with Codeine) [x]    Diazepam (Valium) []  NSAIDs       Lorazepam (Ativan) []  Acetaminophen (Tylenol) []      Clonazepam (Klonopin) []  Acetylsalicylic Acid (Aspirin) []  Butalbital, Caffeine, Aspirin, and Codeine  (Fiorinal with Codeine) []    Antidepressants    "Diclofenac (Cambia) []      Amitriptyline (Elavil) []  Ibuprofen (Motrin) [x]      Desipramine (Norpramin) []  Indomethacin (Indocin) []  Aspirin, Caffeine, and Acetaminophen (Excedrin) (Goodys) [x]    Doxepin (Sinequan) []  Ketoprofen (Orudis) []      Fluoxetine (Prozac) []  Ketorolac (Toradol) []  Acetaminophen, Dichloralphenazone, and Isometheptene (Midrin) []    Imipramine (Tofranil) []  Naproxen (Anaprox) (Aleve) [x]      Nortriptyline (Pamelor) []  Meclofenamic Acid (Meclomen) []      Venlafaxine (Effexor) []  Meloxicam (Mobic) []  Procedures     Desvenlafazine (Pristiq) []  Monoclonals   Greater occipital nerve block []    Duloxetine (Cymbalta) []  Eptinezumab []  Cervical, Thoracic, Lumbar radiofrequency ablation []    Trazadone []  Erenumab-aooe (Aimovig) []  Spenopalatine ganglion block []    Wellbutrin []  Galcanezumab (Emgality) []  Occipital neuro stimulation []    Protriptyline (Vivactil) []  Fremanazumab-vfrm (Ajovy)   Cervical, Thoracic, Lumbar, Caudal Epidural steroid injection [x]    Escitalopram (Lexapro) []  Other []  Sacroiliac joint steroid injection []    Celexa []  Memantine (Namenda) []  Transforaminal epidural steroid injection []        Botox []  Facet joint injections []        Baclofen (Lioresal) []  Cervical, Thoracic, Lumbar medial branch blocks []            Cefaly []            Gamma Core []            Iovera []            Transcranial Magnetic stimulation []                                   "  Medication Reconciliation:   Current Outpatient Medications   Medication Sig Dispense Refill    azelastine (ASTELIN) 137 mcg (0.1 %) nasal spray 2 sprays (274 mcg total) by Nasal route 2 (two) times daily as needed for Rhinitis. 90 mL 3    CLENPIQ 10 mg-3.5 gram- 12 gram/175 mL Soln SMARTSIG:unspecified By Mouth As Directed      dextroamphetamine-amphetamine 30 mg Tab Take 1 tablet (30 mg total) by mouth 2 (two) times daily. For diagnosis code F90.9 60 tablet 0    fluticasone propionate " (FLONASE) 50 mcg/actuation nasal spray 2 sprays (100 mcg total) by Each Nostril route once daily. 48 g 3    mupirocin (BACTROBAN) 2 % ointment Apply topically 2 (two) times daily as needed. 30 g 1    NURTEC 75 mg odt Take 1 tablet (75 mg total) by mouth daily as needed for headache. No more than 3 days use in week. 8 tablet 5    PREVIDENT 5000 ORTHO DEFENSE 1.1 % Pste use as directed      sildenafiL (VIAGRA) 100 MG tablet Take 1 tablet (100 mg total) by mouth daily as needed for Erectile Dysfunction. 90 tablet 1    triamcinolone acetonide 0.5% (KENALOG) 0.5 % Crea Apply topically to inflamed hemorrhoids twice a day as needed 45 g 1     No current facility-administered medications for this visit.     Review of patient's allergies indicates:   Allergen Reactions    Penicillins Rash       PMHx:  Past Medical History:   Diagnosis Date    a Congenital pulmonary valve stenosis     Was Cleared By Pediatric Cardiology; Did Not Require Any Corrective Sx    b Elevated BP W/O DX Of HTN     1/24/19 RXd A Low Salt Diet And Recommended He Stop OTC Creatine Muscle Building Supplements    b Family H/O Hypertension     Both Of Parents    j External Hemorrhoids With H/O Bleeding 03/20/2023    3/20/23 RXd Triamcinolone Cream BID PRN; 3/20/23 Referred To Dr. LENA Del Valle    j Gluteal And Presacral Folliculitis     3/20/23 RXd Topical Bactroban BID PRN, And Also Nasally BID X 5 Days    k Erectile Dysfunction     10/12/15 Testosterone = Normal    k Left varicocele S/P Ligation     k Urinary Fequency     7/16/20 Ordered A U/A And UCx    l Chronic Recurrent Neck Pain     6/20/22 Referred To Dr. Keith Chu (PM&R); 6/20/22 C-Spine XRays = Unremarkable (See Report); Outpatient PT Has Helped A Bit In The Past; He Sees A Chiropracter For This; 09/2018 C-Spine MRI = Was Reportedly Unremarkable    m Family H/O Cerebral Aneurysm     6/20/22 Brain MRA = Ordered    m Migraines     7/16/20 And 7/26/18 Referred To Dr. Megan Nunes, And RXd  Imitrex PRN    n Attention Deficit Disorder ###    7/10/17 Increased His 30 Mg Adderall Bid To Adderall 30 Mg Bid    n Hx Of Opiod Abuse In 2012 ###    Had A  And Was In A Tx Program Then    n Therapeutic Drug Monitoring     o Allergic Rhinosinusitis     6/20/22 Referred To Dr. Liam Garcia    o H/O Nasal Septoplasty In 2013     This Helped With His Migraines    o Right Lower Eyelid Lesions     1/18/17 Referred To Dr. Adnrews Dodd; HSV ??    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, etc)     Employer: [employer]   Tobacco Use    Smoking status: Some Days     Types: Vaping with nicotine    Smokeless tobacco: Former    Tobacco comments:     chews tobacco   Substance and Sexual Activity    Alcohol use: Yes     Comment: soc    Drug use: No     Comment: past Hx abuse, denies any active use and in court ordered monitoring program, does not want to have any narcotic postop    Sexual activity: Yes     Partners: Female     Social Determinants of Health     Stress: Stress Concern Present (10/3/2019)    [employer] - Occupational Stress Questionnaire     Feeling of Stress : To some extent           Labs:   Reviewed in chart     Imaging:   Reviewed in chart       Other testing:  Reviewed in chart     Note: I have independently reviewed any/all imaging/labs/tests and agree with the report (s) as  "documented.  Any discrepancies will be as noted/demarcated by free text.  ANN DELGADO 11/30/2023                     ROS:   Review Of Systems (questions asked, positive or additions in BOLD)  Gen: Weight change, fatigue/malaise, pyrexia   HEENT: Tinnitus, headache,  blurred vision, eye pain, diplopia, photophobia,  nose bleeds, congestion, sore throat, jaw pain, scalp pain, neck stiffness   Card: Palpitations, CP   Pulm: SOB, cough   Vas: Easy bruising, easy bleeding   GI: N/V/D/C, incontinence, hematemesis, hematochezia    : incontinence, hematuria   Endocrine: Temp intolerance, polyuria, polydipsia   M/S: Neck pain, myalgia, back pain, joint pain, falls    Neuro: PER HPI   PSY: Memory loss, confusion, depression, anxiety, trouble in sleep, hallucinations          EXAM:   /86 (BP Location: Right arm, Patient Position: Sitting, BP Method: Medium (Automatic))   Pulse 99   Temp 97.6 °F (36.4 °C) (Temporal)   Resp 17   Ht 5' 8" (1.727 m)   Wt 86.5 kg (190 lb 12.9 oz)   BMI 29.01 kg/m²    GEN:  NAD   HEENT: NC/AT     NEURO:  Mental Status:  Awake, alert and appropriately oriented to time, place, and person.  Normal attention and concentration.  Speech is fluent and appropriate language function (I.e., comprehension)    Cranial Nerves:     Extraocular movements are intact and without nystagmus.  Visual fields are full to confrontation testing.   Facial movement is symmetric.   Hearing is normal. Uvula in midline. FROM of neck in all (6) directions, shoulder shrug symmetrical. Tongue in midline without fasiculation.     Motor:  Antigravity in all limbs  No drift  No resting tremor    DTR:  2+ bilat patellar     Gait and Stance: Normal manner of stance and gait function testing.         This document has been electronically signed by Mr. Syed Stevens MPA, PA-C on 11/30/2023, I have personally typed this message using the EMR.       Dr Willie MD  was available during today's visit.       CC: Melanie, " Jerry MAS MD

## 2023-12-21 ENCOUNTER — HOSPITAL ENCOUNTER (OUTPATIENT)
Facility: HOSPITAL | Age: 33
Discharge: HOME | End: 2023-12-22
Attending: SURGERY | Admitting: SURGERY
Payer: COMMERCIAL

## 2023-12-21 DIAGNOSIS — K37 APPENDICITIS: ICD-10-CM

## 2023-12-21 DIAGNOSIS — K35.30 ACUTE APPENDICITIS WITH LOCALIZED PERITONITIS, WITHOUT PERFORATION, ABSCESS, OR GANGRENE: Primary | ICD-10-CM

## 2023-12-21 PROCEDURE — 11000001 HC ACUTE MED/SURG PRIVATE ROOM

## 2023-12-22 ENCOUNTER — ANESTHESIA EVENT (OUTPATIENT)
Dept: SURGERY | Facility: HOSPITAL | Age: 33
End: 2023-12-22
Payer: COMMERCIAL

## 2023-12-22 ENCOUNTER — ANESTHESIA (OUTPATIENT)
Dept: SURGERY | Facility: HOSPITAL | Age: 33
End: 2023-12-22
Payer: COMMERCIAL

## 2023-12-22 VITALS
HEART RATE: 66 BPM | TEMPERATURE: 99 F | WEIGHT: 182.31 LBS | DIASTOLIC BLOOD PRESSURE: 65 MMHG | HEIGHT: 68 IN | BODY MASS INDEX: 27.63 KG/M2 | SYSTOLIC BLOOD PRESSURE: 118 MMHG | RESPIRATION RATE: 18 BRPM | OXYGEN SATURATION: 98 %

## 2023-12-22 PROBLEM — K35.30 ACUTE APPENDICITIS WITH LOCALIZED PERITONITIS, WITHOUT PERFORATION, ABSCESS, OR GANGRENE: Status: ACTIVE | Noted: 2023-12-22

## 2023-12-22 PROCEDURE — 96368 THER/DIAG CONCURRENT INF: CPT

## 2023-12-22 PROCEDURE — 25000003 PHARM REV CODE 250: Performed by: ANESTHESIOLOGY

## 2023-12-22 PROCEDURE — 71000033 HC RECOVERY, INTIAL HOUR: Performed by: SURGERY

## 2023-12-22 PROCEDURE — D9220A PRA ANESTHESIA: ICD-10-PCS | Mod: ANES,,, | Performed by: ANESTHESIOLOGY

## 2023-12-22 PROCEDURE — 25000003 PHARM REV CODE 250: Performed by: NURSE ANESTHETIST, CERTIFIED REGISTERED

## 2023-12-22 PROCEDURE — 96367 TX/PROPH/DG ADDL SEQ IV INF: CPT

## 2023-12-22 PROCEDURE — 88304 PR  SURG PATH,LEVEL III: ICD-10-PCS | Mod: 26,,, | Performed by: STUDENT IN AN ORGANIZED HEALTH CARE EDUCATION/TRAINING PROGRAM

## 2023-12-22 PROCEDURE — 99204 PR OFFICE/OUTPT VISIT, NEW, LEVL IV, 45-59 MIN: ICD-10-PCS | Mod: 57,,, | Performed by: SURGERY

## 2023-12-22 PROCEDURE — 63600175 PHARM REV CODE 636 W HCPCS: Performed by: SURGERY

## 2023-12-22 PROCEDURE — 96366 THER/PROPH/DIAG IV INF ADDON: CPT

## 2023-12-22 PROCEDURE — 25000003 PHARM REV CODE 250: Performed by: SURGERY

## 2023-12-22 PROCEDURE — 99204 OFFICE O/P NEW MOD 45 MIN: CPT | Mod: 57,,, | Performed by: SURGERY

## 2023-12-22 PROCEDURE — 88304 TISSUE EXAM BY PATHOLOGIST: CPT | Mod: 26,,, | Performed by: STUDENT IN AN ORGANIZED HEALTH CARE EDUCATION/TRAINING PROGRAM

## 2023-12-22 PROCEDURE — 96375 TX/PRO/DX INJ NEW DRUG ADDON: CPT

## 2023-12-22 PROCEDURE — 27201423 OPTIME MED/SURG SUP & DEVICES STERILE SUPPLY: Performed by: SURGERY

## 2023-12-22 PROCEDURE — 44970 LAPAROSCOPY APPENDECTOMY: CPT | Mod: ,,, | Performed by: SURGERY

## 2023-12-22 PROCEDURE — G0378 HOSPITAL OBSERVATION PER HR: HCPCS

## 2023-12-22 PROCEDURE — 37000008 HC ANESTHESIA 1ST 15 MINUTES: Performed by: SURGERY

## 2023-12-22 PROCEDURE — 36000708 HC OR TIME LEV III 1ST 15 MIN: Performed by: SURGERY

## 2023-12-22 PROCEDURE — 96361 HYDRATE IV INFUSION ADD-ON: CPT

## 2023-12-22 PROCEDURE — 71000039 HC RECOVERY, EACH ADD'L HOUR: Performed by: SURGERY

## 2023-12-22 PROCEDURE — 44970 PR LAP,APPENDECTOMY: ICD-10-PCS | Mod: ,,, | Performed by: SURGERY

## 2023-12-22 PROCEDURE — 37000009 HC ANESTHESIA EA ADD 15 MINS: Performed by: SURGERY

## 2023-12-22 PROCEDURE — D9220A PRA ANESTHESIA: Mod: CRNA,,, | Performed by: NURSE ANESTHETIST, CERTIFIED REGISTERED

## 2023-12-22 PROCEDURE — 96376 TX/PRO/DX INJ SAME DRUG ADON: CPT

## 2023-12-22 PROCEDURE — D9220A PRA ANESTHESIA: Mod: ANES,,, | Performed by: ANESTHESIOLOGY

## 2023-12-22 PROCEDURE — 63600175 PHARM REV CODE 636 W HCPCS: Performed by: NURSE ANESTHETIST, CERTIFIED REGISTERED

## 2023-12-22 PROCEDURE — D9220A PRA ANESTHESIA: ICD-10-PCS | Mod: CRNA,,, | Performed by: NURSE ANESTHETIST, CERTIFIED REGISTERED

## 2023-12-22 PROCEDURE — 88304 TISSUE EXAM BY PATHOLOGIST: CPT | Performed by: STUDENT IN AN ORGANIZED HEALTH CARE EDUCATION/TRAINING PROGRAM

## 2023-12-22 PROCEDURE — 96365 THER/PROPH/DIAG IV INF INIT: CPT

## 2023-12-22 PROCEDURE — 36000709 HC OR TIME LEV III EA ADD 15 MIN: Performed by: SURGERY

## 2023-12-22 PROCEDURE — 63600175 PHARM REV CODE 636 W HCPCS: Performed by: ANESTHESIOLOGY

## 2023-12-22 RX ORDER — MORPHINE SULFATE 4 MG/ML
4 INJECTION, SOLUTION INTRAMUSCULAR; INTRAVENOUS EVERY 4 HOURS PRN
Status: DISCONTINUED | OUTPATIENT
Start: 2023-12-22 | End: 2023-12-22 | Stop reason: HOSPADM

## 2023-12-22 RX ORDER — ONDANSETRON 2 MG/ML
4 INJECTION INTRAMUSCULAR; INTRAVENOUS EVERY 6 HOURS PRN
Status: DISCONTINUED | OUTPATIENT
Start: 2023-12-22 | End: 2023-12-22 | Stop reason: HOSPADM

## 2023-12-22 RX ORDER — LIDOCAINE HYDROCHLORIDE 10 MG/ML
INJECTION INFILTRATION; PERINEURAL
Status: DISCONTINUED | OUTPATIENT
Start: 2023-12-22 | End: 2023-12-22 | Stop reason: HOSPADM

## 2023-12-22 RX ORDER — SODIUM CHLORIDE 0.9 % (FLUSH) 0.9 %
10 SYRINGE (ML) INJECTION
Status: DISCONTINUED | OUTPATIENT
Start: 2023-12-22 | End: 2023-12-22 | Stop reason: HOSPADM

## 2023-12-22 RX ORDER — OXYCODONE AND ACETAMINOPHEN 5; 325 MG/1; MG/1
1 TABLET ORAL EVERY 6 HOURS PRN
Qty: 12 TABLET | Refills: 0 | Status: SHIPPED | OUTPATIENT
Start: 2023-12-22 | End: 2023-12-29

## 2023-12-22 RX ORDER — ONDANSETRON 2 MG/ML
INJECTION INTRAMUSCULAR; INTRAVENOUS
Status: DISCONTINUED | OUTPATIENT
Start: 2023-12-22 | End: 2023-12-22

## 2023-12-22 RX ORDER — HYDROMORPHONE HYDROCHLORIDE 2 MG/ML
0.2 INJECTION, SOLUTION INTRAMUSCULAR; INTRAVENOUS; SUBCUTANEOUS EVERY 5 MIN PRN
Status: DISCONTINUED | OUTPATIENT
Start: 2023-12-22 | End: 2023-12-22 | Stop reason: HOSPADM

## 2023-12-22 RX ORDER — PROCHLORPERAZINE EDISYLATE 5 MG/ML
5 INJECTION INTRAMUSCULAR; INTRAVENOUS EVERY 30 MIN PRN
Status: DISCONTINUED | OUTPATIENT
Start: 2023-12-22 | End: 2023-12-22 | Stop reason: HOSPADM

## 2023-12-22 RX ORDER — LIDOCAINE HYDROCHLORIDE 20 MG/ML
INJECTION INTRAVENOUS
Status: DISCONTINUED | OUTPATIENT
Start: 2023-12-22 | End: 2023-12-22

## 2023-12-22 RX ORDER — ROCURONIUM BROMIDE 10 MG/ML
INJECTION, SOLUTION INTRAVENOUS
Status: DISCONTINUED | OUTPATIENT
Start: 2023-12-22 | End: 2023-12-22

## 2023-12-22 RX ORDER — KETOROLAC TROMETHAMINE 30 MG/ML
INJECTION, SOLUTION INTRAMUSCULAR; INTRAVENOUS
Status: DISCONTINUED | OUTPATIENT
Start: 2023-12-22 | End: 2023-12-22

## 2023-12-22 RX ORDER — SODIUM CHLORIDE 9 MG/ML
INJECTION, SOLUTION INTRAVENOUS CONTINUOUS
Status: DISCONTINUED | OUTPATIENT
Start: 2023-12-22 | End: 2023-12-22

## 2023-12-22 RX ORDER — BUPIVACAINE HYDROCHLORIDE 5 MG/ML
INJECTION, SOLUTION PERINEURAL
Status: DISCONTINUED | OUTPATIENT
Start: 2023-12-22 | End: 2023-12-22 | Stop reason: HOSPADM

## 2023-12-22 RX ORDER — PROPOFOL 10 MG/ML
VIAL (ML) INTRAVENOUS
Status: DISCONTINUED | OUTPATIENT
Start: 2023-12-22 | End: 2023-12-22

## 2023-12-22 RX ORDER — SODIUM CHLORIDE, SODIUM LACTATE, POTASSIUM CHLORIDE, CALCIUM CHLORIDE 600; 310; 30; 20 MG/100ML; MG/100ML; MG/100ML; MG/100ML
INJECTION, SOLUTION INTRAVENOUS CONTINUOUS PRN
Status: DISCONTINUED | OUTPATIENT
Start: 2023-12-22 | End: 2023-12-22

## 2023-12-22 RX ORDER — FENTANYL CITRATE 50 UG/ML
INJECTION, SOLUTION INTRAMUSCULAR; INTRAVENOUS
Status: DISCONTINUED | OUTPATIENT
Start: 2023-12-22 | End: 2023-12-22

## 2023-12-22 RX ORDER — MIDAZOLAM HYDROCHLORIDE 1 MG/ML
INJECTION INTRAMUSCULAR; INTRAVENOUS
Status: DISCONTINUED | OUTPATIENT
Start: 2023-12-22 | End: 2023-12-22

## 2023-12-22 RX ORDER — DEXAMETHASONE SODIUM PHOSPHATE 4 MG/ML
INJECTION, SOLUTION INTRA-ARTICULAR; INTRALESIONAL; INTRAMUSCULAR; INTRAVENOUS; SOFT TISSUE
Status: DISCONTINUED | OUTPATIENT
Start: 2023-12-22 | End: 2023-12-22

## 2023-12-22 RX ORDER — OXYCODONE AND ACETAMINOPHEN 5; 325 MG/1; MG/1
1 TABLET ORAL EVERY 4 HOURS PRN
Status: DISCONTINUED | OUTPATIENT
Start: 2023-12-22 | End: 2023-12-22 | Stop reason: HOSPADM

## 2023-12-22 RX ORDER — KETOROLAC TROMETHAMINE 10 MG/1
10 TABLET, FILM COATED ORAL EVERY 6 HOURS PRN
Qty: 12 TABLET | Refills: 0 | Status: SHIPPED | OUTPATIENT
Start: 2023-12-22 | End: 2023-12-25

## 2023-12-22 RX ORDER — OXYCODONE HYDROCHLORIDE 5 MG/1
5 TABLET ORAL
Status: DISCONTINUED | OUTPATIENT
Start: 2023-12-22 | End: 2023-12-22 | Stop reason: HOSPADM

## 2023-12-22 RX ORDER — ACETAMINOPHEN 10 MG/ML
INJECTION, SOLUTION INTRAVENOUS
Status: DISCONTINUED | OUTPATIENT
Start: 2023-12-22 | End: 2023-12-22

## 2023-12-22 RX ORDER — METRONIDAZOLE 500 MG/100ML
500 INJECTION, SOLUTION INTRAVENOUS
Status: DISCONTINUED | OUTPATIENT
Start: 2023-12-22 | End: 2023-12-22 | Stop reason: HOSPADM

## 2023-12-22 RX ORDER — CIPROFLOXACIN 2 MG/ML
400 INJECTION, SOLUTION INTRAVENOUS
Status: DISCONTINUED | OUTPATIENT
Start: 2023-12-22 | End: 2023-12-22 | Stop reason: HOSPADM

## 2023-12-22 RX ADMIN — MORPHINE SULFATE 4 MG: 4 INJECTION INTRAVENOUS at 09:12

## 2023-12-22 RX ADMIN — METRONIDAZOLE 500 MG: 5 INJECTION, SOLUTION INTRAVENOUS at 12:12

## 2023-12-22 RX ADMIN — CIPROFLOXACIN 400 MG: 2 INJECTION, SOLUTION INTRAVENOUS at 01:12

## 2023-12-22 RX ADMIN — MORPHINE SULFATE 4 MG: 4 INJECTION INTRAVENOUS at 12:12

## 2023-12-22 RX ADMIN — ONDANSETRON 4 MG: 2 INJECTION, SOLUTION INTRAMUSCULAR; INTRAVENOUS at 11:12

## 2023-12-22 RX ADMIN — METRONIDAZOLE 500 MG: 5 INJECTION, SOLUTION INTRAVENOUS at 09:12

## 2023-12-22 RX ADMIN — HYDROMORPHONE HYDROCHLORIDE 0.2 MG: 2 INJECTION, SOLUTION INTRAMUSCULAR; INTRAVENOUS; SUBCUTANEOUS at 12:12

## 2023-12-22 RX ADMIN — ONDANSETRON 4 MG: 2 INJECTION INTRAMUSCULAR; INTRAVENOUS at 09:12

## 2023-12-22 RX ADMIN — SODIUM CHLORIDE, SODIUM LACTATE, POTASSIUM CHLORIDE, AND CALCIUM CHLORIDE: .6; .31; .03; .02 INJECTION, SOLUTION INTRAVENOUS at 10:12

## 2023-12-22 RX ADMIN — FENTANYL CITRATE 100 MCG: 0.05 INJECTION, SOLUTION INTRAMUSCULAR; INTRAVENOUS at 10:12

## 2023-12-22 RX ADMIN — CIPROFLOXACIN 400 MG: 2 INJECTION, SOLUTION INTRAVENOUS at 09:12

## 2023-12-22 RX ADMIN — SUGAMMADEX 200 MG: 100 INJECTION, SOLUTION INTRAVENOUS at 11:12

## 2023-12-22 RX ADMIN — MORPHINE SULFATE 4 MG: 4 INJECTION INTRAVENOUS at 04:12

## 2023-12-22 RX ADMIN — PROCHLORPERAZINE EDISYLATE 5 MG: 5 INJECTION INTRAMUSCULAR; INTRAVENOUS at 12:12

## 2023-12-22 RX ADMIN — DEXAMETHASONE SODIUM PHOSPHATE 8 MG: 4 INJECTION, SOLUTION INTRA-ARTICULAR; INTRALESIONAL; INTRAMUSCULAR; INTRAVENOUS; SOFT TISSUE at 11:12

## 2023-12-22 RX ADMIN — ONDANSETRON 4 MG: 2 INJECTION INTRAMUSCULAR; INTRAVENOUS at 12:12

## 2023-12-22 RX ADMIN — ACETAMINOPHEN 1000 MG: 10 INJECTION, SOLUTION INTRAVENOUS at 11:12

## 2023-12-22 RX ADMIN — PROPOFOL 200 MG: 10 INJECTION, EMULSION INTRAVENOUS at 10:12

## 2023-12-22 RX ADMIN — KETOROLAC TROMETHAMINE 30 MG: 30 INJECTION, SOLUTION INTRAMUSCULAR; INTRAVENOUS at 11:12

## 2023-12-22 RX ADMIN — ROCURONIUM BROMIDE 50 MG: 10 INJECTION, SOLUTION INTRAVENOUS at 10:12

## 2023-12-22 RX ADMIN — OXYCODONE HYDROCHLORIDE 5 MG: 5 TABLET ORAL at 12:12

## 2023-12-22 RX ADMIN — LIDOCAINE HYDROCHLORIDE 100 MG: 20 INJECTION, SOLUTION INTRAVENOUS at 10:12

## 2023-12-22 RX ADMIN — MIDAZOLAM HYDROCHLORIDE 2 MG: 1 INJECTION, SOLUTION INTRAMUSCULAR; INTRAVENOUS at 10:12

## 2023-12-22 RX ADMIN — SODIUM CHLORIDE: 9 INJECTION, SOLUTION INTRAVENOUS at 12:12

## 2023-12-22 NOTE — PLAN OF CARE
Patient has met PACU discharge criteria, VSS on 2 liters O2, pain controlled. Family updated by text message x 2. Released from PACU by Anesthesia MD.

## 2023-12-22 NOTE — PLAN OF CARE
Introduced as VN and will be reviewing discharge instructions.  Educated patient on reason for admission, home medication list, and discharge instructions including when to return to ED and the following doctor appointments.  Education per flowsheet.  Opportunity given for questions and questions answered.     Nurse notified of   completion of discharge education. Patient waiting for wheelchair

## 2023-12-22 NOTE — PLAN OF CARE
CHG wipes given to pt w instructions and gown,voiced complete understanding, remains npo,ivf in progress.

## 2023-12-22 NOTE — H&P
[facility]  INPATIENT HP    REASON FOR CONSULT/ADMISSION: Acute appendicitis     HPI: Lance Andrade is a 33 y.o. male admitted with abdominal pain.  Presented to [facility].  CT shows acute appendicitis.  Pt otherwise healthy.     I have reviewed the patient's chart including prior progress notes, procedures and testing.     ROS:   Review of Systems   All other systems reviewed and are negative.      PROBLEM LIST:  Patient Active Problem List   Diagnosis    History of nasal septoplasty    Cystic acne    Status post inguinal ligation of varicocele    Palpitation    History of substance abuse    Decreased libido    Congenital pulmonary valve stenosis    Hx Of Opiod Abuse 2012    Migraines    BLE Varicose Veins    Left varicocele    Attention Deficit Disorder    Therapeutic Drug Monitoring    Right Lower Eyelid Lesions    Chronic Recurrent Neck Pain    Elevated BP without diagnosis of hypertension    Urinary Fequency    Family H/O Cerebral Aneurysm    Allergic Rhinosinusitis    External Hemorrhoids With H/O Bleeding    Gluteal And Presacral Folliculitis         HISTORY  Past Medical History:   Diagnosis Date    a Congenital pulmonary valve stenosis     Was Cleared By Pediatric Cardiology; Did Not Require Any Corrective Sx    b Elevated BP W/O DX Of HTN     1/24/19 RXd A Low Salt Diet And Recommended He Stop OTC Creatine Muscle Building Supplements    b Family H/O Hypertension     Both Of Parents    j External Hemorrhoids With H/O Bleeding 03/20/2023    3/20/23 RXd Triamcinolone Cream BID PRN; 3/20/23 Referred To Dr. LENA Del Valle    j Gluteal And Presacral Folliculitis     3/20/23 RXd Topical Bactroban BID PRN, And Also Nasally BID X 5 Days    k Erectile Dysfunction     10/12/15 Testosterone = Normal    k Left varicocele S/P Ligation     k Urinary Fequency     7/16/20 Ordered A U/A And UCx    l Chronic Recurrent Neck Pain     6/20/22 Referred To Dr. Keith Chu (PM&R); 6/20/22 C-Spine XRays =  Unremarkable (See Report); Outpatient PT Has Helped A Bit In The Past; He Sees A Chiropracter For This; 09/2018 C-Spine MRI = Was Reportedly Unremarkable    m Family H/O Cerebral Aneurysm     6/20/22 Brain MRA = Ordered    m Migraines     7/16/20 And 7/26/18 Referred To Dr. Megan Nunes, And RXd Imitrex PRN    n Attention Deficit Disorder ###    7/10/17 Increased His 30 Mg Adderall Bid To Adderall 30 Mg Bid    n Hx Of Opiod Abuse In 2012 ###    Had A  And Was In A Tx Program Then    n Therapeutic Drug Monitoring     o Allergic Rhinosinusitis     6/20/22 Referred To Dr. Liam Garcia    o H/O Nasal Septoplasty In 2013     This Helped With His Migraines    o Right Lower Eyelid Lesions     1/18/17 Referred To Dr. Andrews Dodd; HSV ??    q BLE Varicose Veins     Wellness Visit 5/3/2023        Past Surgical History:   Procedure Laterality Date    DENTAL SURGERY      SINUS SURGERY      TONSILLECTOMY      WISDOM TOOTH EXTRACTION      WRIST SURGERY      left       Social History     Tobacco Use    Smoking status: Some Days     Types: Vaping with nicotine    Smokeless tobacco: Former    Tobacco comments:     chews tobacco   Substance Use Topics    Alcohol use: Yes     Comment: soc    Drug use: No     Comment: past Hx abuse, denies any active use and in court ordered monitoring program, does not want to have any narcotic postop       Family History   Problem Relation Age of Onset    Allergic rhinitis Father     Angioedema Neg Hx     Asthma Neg Hx     Atopy Neg Hx     Immunodeficiency Neg Hx     Eczema Neg Hx     Urticaria Neg Hx          MEDS:  Current Facility-Administered Medications on File Prior to Encounter   Medication Dose Route Frequency Provider Last Rate Last Admin    [COMPLETED] iohexoL (OMNIPAQUE 350) injection 100 mL  100 mL Intravenous ONCE PRN Tawanna Lozada NP   100 mL at 12/21/23 3916    [COMPLETED] ketorolac injection 30 mg  30 mg Intravenous ED 1 Time Tawanna Lozada NP   30 mg at  12/21/23 1756    [COMPLETED] morphine injection 4 mg  4 mg Intravenous ED 1 Time CaseKarma MD   4 mg at 12/21/23 1940    [COMPLETED] morphine injection 4 mg  4 mg Intravenous ED 1 Time CaseKarma MD   4 mg at 12/21/23 2208    [COMPLETED] ondansetron injection 4 mg  4 mg Intravenous ED 1 Time Tawanna Lozada NP   4 mg at 12/21/23 1805    [COMPLETED] piperacillin-tazobactam (ZOSYN) 4.5 g in dextrose 5 % in water (D5W) 100 mL IVPB (MB+)  4.5 g Intravenous ED 1 Time CaseKarma MD   Stopped at 12/21/23 2036    [COMPLETED] sodium chloride 0.9% bolus 1,000 mL 1,000 mL  1,000 mL Intravenous ED 1 Time Tawanna Lozada NP   Stopped at 12/21/23 1848    [DISCONTINUED] morphine injection 2 mg  2 mg Intravenous ED 1 Time Tawanna Lozada NP         Current Outpatient Medications on File Prior to Encounter   Medication Sig Dispense Refill    azelastine (ASTELIN) 137 mcg (0.1 %) nasal spray 2 sprays (274 mcg total) by Nasal route 2 (two) times daily as needed for Rhinitis. 90 mL 3    [START ON 2/1/2024] dextroamphetamine-amphetamine 30 mg Tab Take 1 tablet (30 mg total) by mouth 2 (two) times daily. For diagnosis code F90.9 60 tablet 0    fluticasone propionate (FLONASE) 50 mcg/actuation nasal spray 2 sprays (100 mcg total) by Each Nostril route once daily. 48 g 3    NURTEC 75 mg odt Take 1 tablet (75 mg total) by mouth daily as needed for headache. No more than 3 days use in week. 8 tablet 5    PREVIDENT 5000 ORTHO DEFENSE 1.1 % Pste use as directed      mupirocin (BACTROBAN) 2 % ointment Apply topically 2 (two) times daily as needed. 30 g 1    sildenafiL (VIAGRA) 100 MG tablet Take 1 tablet (100 mg total) by mouth daily as needed for Erectile Dysfunction. 90 tablet 1       ALLERGIES:  Review of patient's allergies indicates:   Allergen Reactions    Penicillins Rash         VITALS:  Temp:  [97.4 °F (36.3 °C)-98.7 °F (37.1 °C)] 98.4 °F (36.9 °C)  Pulse:  [80-98] 83  Resp:  [14-20] 18  SpO2:   "[97 %-100 %] 98 %  BP: (112-150)/(63-85) 115/70    I/O last 3 completed shifts:  In: 543.5 [I.V.:247.2; IV Piggyback:296.3]  Out: 0       PHYSICAL EXAM:  Physical Exam  Constitutional:       Appearance: Normal appearance.   HENT:      Head: Normocephalic and atraumatic.   Pulmonary:      Effort: Pulmonary effort is normal.   Abdominal:      Palpations: Abdomen is soft.      Tenderness: There is abdominal tenderness. There is guarding and rebound.   Skin:     General: Skin is warm and dry.   Psychiatric:         Mood and Affect: Mood normal.         Behavior: Behavior normal.           LABS:  Lab Results   Component Value Date    WBC 23.48 (H) 12/21/2023    RBC 5.26 12/21/2023    HGB 15.4 12/21/2023    HCT 46.4 12/21/2023     12/21/2023     Lab Results   Component Value Date     12/21/2023     12/21/2023    K 4.2 12/21/2023     12/21/2023    CO2 24 12/21/2023    BUN 15 12/21/2023    CREATININE 1.0 12/21/2023    CALCIUM 9.3 12/21/2023     Lab Results   Component Value Date    ALT 31 12/21/2023    AST 25 12/21/2023    ALKPHOS 81 12/21/2023    BILITOT 0.8 12/21/2023     No results found for: "MG", "PHOS"    STUDIES:  CT images and reports were personally reviewed.        ASSESSMENT & PLAN:  33 y.o. male with acute appendicitis, to OR for lap appy.  Risks, benefits, and alternatives to the procedure were explained to the patient in detail.  All questions were answered and the patient has requested the procedure be done.  Informed consent was obtained.        Skyler Berger M.D., F.A.C.S.  [facility] General Surgery  [facility]            "

## 2023-12-22 NOTE — ANESTHESIA PREPROCEDURE EVALUATION
12/22/2023  Lance Andrade is a 33 y.o., male here for lap appendectomy.       Pre-op Assessment    I have reviewed the Patient Summary Reports.    I have reviewed the NPO Status.   I have reviewed the Medications.     Review of Systems  Anesthesia Hx:             Denies Family Hx of Anesthesia complications.    Denies Personal Hx of Anesthesia complications.                    Social:  Alcohol Use, Smoker, Vaping       Cardiovascular:      Congenital Heart Disease      Congenital Heart Disease:   Pulmonary valve stenosis- chart review states was cleared by pediatric cardiology and required no corrective surgery                      Renal/:  Renal/ Normal                 Hepatic/GI:  Hepatic/GI Normal                 Neurological:      Headaches     ADHD                            Psych:   anxiety depression Previous opioid use disorder in 2012              Patient Active Problem List   Diagnosis    History of nasal septoplasty    Cystic acne    Status post inguinal ligation of varicocele    Palpitation    History of substance abuse    Decreased libido    Congenital pulmonary valve stenosis    Hx Of Opiod Abuse 2012    Migraines    BLE Varicose Veins    Left varicocele    Attention Deficit Disorder    Therapeutic Drug Monitoring    Right Lower Eyelid Lesions    Chronic Recurrent Neck Pain    Elevated BP without diagnosis of hypertension    Urinary Fequency    Family H/O Cerebral Aneurysm    Allergic Rhinosinusitis    External Hemorrhoids With H/O Bleeding    Gluteal And Presacral Folliculitis    Acute appendicitis with localized peritonitis, without perforation, abscess, or gangrene       Past Medical History:   Diagnosis Date    a Congenital pulmonary valve stenosis     Was Cleared By Pediatric Cardiology; Did Not Require Any Corrective Sx    b Elevated BP W/O DX Of HTN     1/24/19 RXd A Low Salt Diet And  Recommended He Stop OTC Creatine Muscle Building Supplements    b Family H/O Hypertension     Both Of Parents    j External Hemorrhoids With H/O Bleeding 03/20/2023    3/20/23 RXd Triamcinolone Cream BID PRN; 3/20/23 Referred To Dr. LENA scott Gluteal And Presacral Folliculitis     3/20/23 RXd Topical Bactroban BID PRN, And Also Nasally BID X 5 Days    k Erectile Dysfunction     10/12/15 Testosterone = Normal    k Left varicocele S/P Ligation     k Urinary Fequency     7/16/20 Ordered A U/A And UCx    l Chronic Recurrent Neck Pain     6/20/22 Referred To Dr. Keith Chu (PM&R); 6/20/22 C-Spine XRays = Unremarkable (See Report); Outpatient PT Has Helped A Bit In The Past; He Sees A Chiropracter For This; 09/2018 C-Spine MRI = Was Reportedly Unremarkable    m Family H/O Cerebral Aneurysm     6/20/22 Brain MRA = Ordered    m Migraines     7/16/20 And 7/26/18 Referred To Dr. Megan Nunes, And RXd Imitrex PRN    n Attention Deficit Disorder ###    7/10/17 Increased His 30 Mg Adderall Bid To Adderall 30 Mg Bid    n Hx Of Opiod Abuse In 2012 ###    Had A  And Was In A Tx Program Then    n Therapeutic Drug Monitoring     o Allergic Rhinosinusitis     6/20/22 Referred To Dr. Liam Garcia    o H/O Nasal Septoplasty In 2013     This Helped With His Migraines    o Right Lower Eyelid Lesions     1/18/17 Referred To Dr. Andrews Dodd; HSV ??    q BLE Varicose Veins     Wellness Visit 5/3/2023        ECHO: No results found for this or any previous visit.      Body mass index is 27.72 kg/m².    Tobacco Use: High Risk (12/21/2023)    Patient History     Smoking Tobacco Use: Some Days     Smokeless Tobacco Use: Former     Passive Exposure: Not on file       Social History     Substance and Sexual Activity   Drug Use No    Comment: past Hx abuse, denies any active use and in court ordered monitoring program, does not want to have any narcotic postop        Alcohol Use: Not on file       Review of patient's  allergies indicates:   Allergen Reactions    Penicillins Rash         Airway:  No value filed.     Physical Exam  General: Well nourished, Cooperative, Alert and Oriented    Airway:  Mallampati: II   Mouth Opening: Normal  TM Distance: Normal  Tongue: Normal  Neck ROM: Normal ROM    Dental:  Intact        Anesthesia Plan  Type of Anesthesia, risks & benefits discussed:    Anesthesia Type: Gen ETT  Intra-op Monitoring Plan: Standard ASA Monitors  Post Op Pain Control Plan: multimodal analgesia  Induction:  IV  Airway Plan: Video and Direct, Post-Induction  Informed Consent: Informed consent signed with the Patient and all parties understand the risks and agree with anesthesia plan.  All questions answered.   ASA Score: 2    Ready For Surgery From Anesthesia Perspective.     .

## 2023-12-22 NOTE — PLAN OF CARE
[facility]  Initial Discharge Assessment       Primary Care Provider: Jerry Navarro MD    Admission Diagnosis: Appendicitis [K37]    Admission Date: 12/21/2023  Expected Discharge Date: 12/22/2023    Consult: gen surg    Payor: [facility] RESOURCES / Plan: [facility] HEALTH / Product Type: Commercial /     Extended Emergency Contact Information  Primary Emergency Contact: Velma Butler  Address: [address] [place] [address]  Home Phone: [phone]  Mobile Phone: [phone]  Relation: Mother    Discharge Plan A: (P) Home  Discharge Plan B: (P) Home Health      [id number] - [place], [place] - [address] [facility] [address]  [address] [place] [address]  Phone: [phone] Fax: [phone]    [facility] - [place], [place] - [address]  [address] [facility] [address]  Phone: [phone] Fax: [phone]    [id number] - [place], [place] - [address]  [address] [facility]  [place] [address]  Phone: [phone] Fax: [phone]    [facility] - [place] - [address]  [address] [place]) [place] [address]  Phone: [phone] Fax: [phone]    [facility] - [place], [place] - [address]  [address] [place] [address]  Phone: [phone] Fax: [phone]      Initial Assessment (most recent)       Adult Discharge Assessment - 12/22/23 1505          Discharge Assessment    Assessment Type Discharge Planning Assessment (P)      Confirmed/corrected address, phone number and insurance Yes (P)      Confirmed Demographics Correct on Facesheet (P)      Source of Information patient;family (P)    Velma hayes ([phone])    Communicated JORGE with patient/caregiver Yes (P)      People in Home alone (P)      Do you expect to return to your current living situation? Yes (P)      Do you  have help at home or someone to help you manage your care at home? Yes (P)      Prior to hospitilization cognitive status: Alert/Oriented (P)      Current cognitive status: Alert/Oriented (P)      Equipment Currently Used at Home blood pressure machine (P)      Readmission within 30 days? No (P)      Patient currently being followed by outpatient case management? No (P)      Do you currently have service(s) that help you manage your care at home? No (P)      Do you take prescription medications? Yes (P)      Do you have prescription coverage? Yes (P)      Do you have any problems affording any of your prescribed medications? No (P)      Is the patient taking medications as prescribed? yes (P)      How do you get to doctors appointments? car, drives self (P)      Are you on dialysis? No (P)      Do you take coumadin? No (P)      Discharge Plan A Home (P)      Discharge Plan B Home Health (P)      DME Needed Upon Discharge  none (P)      Discharge Plan discussed with: Patient;Parent(s) (P)                       7495  DC order noted. Patient resting quietly in bed with mother, Vemla Butler ([phone]), at the bedside when CM rounded via [employer]. Patient was admitted with acute appendicitis & is s/p lap appy done by Dr Berger today.    Patient lives alone, is independent of all ADLs, & denied the need for assistance with transportation at time of discharge. Patient in agreement with plan to discharge home today & verbalized understanding that he will be notified of a gen surg post-op hospfu appt.     5998  Message sent to nurse Estrella, virtual nurse Odalys, & pharmacist Rubia informing that the pt is cleared to discharge.      Will continue to follow.

## 2023-12-22 NOTE — ANESTHESIA POSTPROCEDURE EVALUATION
Anesthesia Post Evaluation    Patient: Lance Andrade    Procedure(s) Performed: Procedure(s) (LRB):  APPENDECTOMY, LAPAROSCOPIC (N/A)    Final Anesthesia Type: general      Patient location during evaluation: PACU  Patient participation: Yes- Able to Participate  Level of consciousness: awake and alert  Post-procedure vital signs: reviewed and stable  Pain management: adequate  Airway patency: patent    PONV status at discharge: No PONV  Anesthetic complications: no      Cardiovascular status: stable  Respiratory status: spontaneous ventilation  Hydration status: euvolemic  Follow-up not needed.              Vitals Value Taken Time   /89 12/22/23 1246   Temp 37 °C (98.6 °F) 12/22/23 1158   Pulse 92 12/22/23 1250   Resp 19 12/22/23 1250   SpO2 95 % 12/22/23 1250   Vitals shown include unvalidated device data.      No case tracking events are documented in the log.      Pain/Krishna Score: Pain Rating Prior to Med Admin: 6 (12/22/2023 12:35 PM)  Pain Rating Post Med Admin: 6 (12/22/2023  5:05 AM)  Krishna Score: 10 (12/22/2023 12:20 PM)

## 2023-12-22 NOTE — PLAN OF CARE
[facility],dr pastor notified of pt's c/o nausea and abd pain.Prns given as ordered,npo w ivf in progress,poc reviewed,voiced understanding.

## 2023-12-22 NOTE — TRANSFER OF CARE
"Anesthesia Transfer of Care Note    Patient: Lance Andrade    Procedure(s) Performed: Procedure(s) (LRB):  APPENDECTOMY, LAPAROSCOPIC (N/A)    Patient location: PACU    Anesthesia Type: general    Transport from OR: Transported from OR on 6-10 L/min O2 by face mask with adequate spontaneous ventilation    Post pain: adequate analgesia    Post assessment: no apparent anesthetic complications and tolerated procedure well    Post vital signs: stable    Level of consciousness: awake    Nausea/Vomiting: no nausea/vomiting    Complications: none    Transfer of care protocol was followed      Last vitals: Visit Vitals  /70   Pulse 83   Temp 36.9 °C (98.4 °F)   Resp 18   Ht 5' 8" (1.727 m)   Wt 82.7 kg (182 lb 5.1 oz)   SpO2 98%   BMI 27.72 kg/m²     "

## 2023-12-22 NOTE — PLAN OF CARE
Problem: Adult Inpatient Plan of Care  Goal: Plan of Care Review  Outcome: Ongoing, Progressing    VN cued into room to complete admit assessment. VIP model introduced; VN working alongside bedside treatment team.  Plan of care reviewed with patient. Patient informed of fall risk, fall precautions, call light within reach, side rails x2 elevated. Patient notified to ask staff for assistance. Patient verbalized complete understanding. Time allowed for questions. Will continue to monitor and intervene as needed.  Chart reviewed.

## 2023-12-22 NOTE — ANESTHESIA PROCEDURE NOTES
Intubation    Date/Time: 12/22/2023 10:57 AM    Performed by: Jessica Nelson  Authorized by: Santi Hensley Jr., CRNA    Intubation:     Induction:  Intravenous    Intubated:  Postinduction    Mask Ventilation:  Easy mask    Attempts:  1    Attempted By:  Student    Method of Intubation:  Direct    Blade:  Chrissy 3    Laryngeal View Grade: Grade I - full view of cords      Difficult Airway Encountered?: No      Complications:  None    Airway Device:  Oral endotracheal tube    Airway Device Size:  7.5    Style/Cuff Inflation:  Cuffed (inflated to minimal occlusive pressure)    Tube secured:  23    Secured at:  The lips    Placement Verified By:  Capnometry    Complicating Factors:  None    Findings Post-Intubation:  BS equal bilateral

## 2023-12-22 NOTE — OP NOTE
PATIENT: Lance Andrade    MRN: [id number]    Date of Procedure:  12/22/2023    Surgeon: Skyler Berger M.D.    Assistant: Jeronimo Maya M.D.    Procedure: Laparoscopic Appendectomy    Pre-Operative Diagnosis: Acute appendicitis     Post-Operative Diagnosis: same    Anesthesia: General    Specimen: Appendix    Complications: none    Estimated Blood Loss (EBL): minimal    INDICATIONS: The patient is a 33 y.o. male  who presents to the emergency room with clinical and radiologic evidence of acute appendicitis. The patient was counseled on their options for treatment and they expressed the desire to proceed with surgical intervention. The risks of the procedure were described to the patient including bleeding, infection, pain, scarring, wound complications including hernias, injury to local structures, potential need for further surgery, perioperative cardiac/pulmonary/thromboembolic events, and possible death.    PROCEDURE:  After surgical consent was obtained, the patient was transported to the operative theater and onto the operating room table in the supine position.  General endotracheal anesthesia was administered without difficulty.  Perioperative antibiotics were given and a time-out was performed in order to ensure the proper patient and procedure.  The patient's abdomen was prepped and draped in a standard sterile fashion.  An incision was made in the skin around the umbilicus and carried down bluntly to the fascia.  The fascia was sharply divided and the abdominal cavity was entered bluntly.  A trocar was inserted without difficulty and the abdomen was insufflated.  Two additional trocars were inserted under direct visualization and without issue. The patient was placed in the Trendelenburg position with the left side down.  The appendix was identified and found to be acutely inflamed and without evidence of rupture.  There was no purulent fluid.  The appendix was bluntly elevated off the retroperitoneum.  The  base of the appendix was identified and a window was made in the mesoappendix.  The appendiceal base was then stapled and divided using at Endo stapler with a tissue load.  The mesoappendix was similarly divided with a ligasure device  The appendix was placed in a specimen bag and removed from the field.  The right lower quadrant was irrigated out with sterile saline.  The staple line was intact.  We desufflated the abdomen under direct visualization and removed all trocars.  The fascial defect at the periumbilical incision was closed using a figure-of-eight 0 Vicryl suture.  The incisions were irrigated out with sterile saline and reinjected with local anesthetic.  The skin openings were closed using interrupted Monocryl stitches and covered with a sterile Band-Aid.  At the end of the procedure the instrument, lap, and needle counts were correct.  The patient was awoken from anesthesia having tolerated the procedure without difficulty and was returned to the PACU in a stable condition.  Any available family was updated and all questions were answered.    Skyler Berger M.D., F.A.C.S.  [facility] and General Surgery  [facility]

## 2023-12-23 NOTE — PLAN OF CARE
[facility]  Discharge Final Note    Primary Care Provider: Jerry Navarro MD    Expected Discharge Date: 12/22/2023    Final Discharge Note (most recent)       Final Note - 12/23/23 0719          Final Note    Assessment Type Final Discharge Note (P)      Anticipated Discharge Disposition Home or Self Care (P)      Hospital Resources/Appts/Education Provided Appointments scheduled and added to AVS (P)                        Contact Info       Skyler Berger MD   Specialty: General Surgery    [address]  [address]  [facility] [address]   Phone: [phone]       Next Steps: Follow up on 1/5/2024    Instructions: at 2:40pm; general surgery post op appointment

## 2023-12-23 NOTE — DISCHARGE SUMMARY
[facility]  Discharge Summary  General Surgery      Admit Date: 12/21/2023    Discharge Date: 12/22/2023  3:38 PM    Attending Physician/Discharge Provider: Skyler Berger M.D.    Reason for Admission: acute appendicitis     Procedures Performed: Procedure(s) (LRB):  APPENDECTOMY, LAPAROSCOPIC (N/A)    Hospital Course:  Patient is a 32 yo WM who was transferred from [facility] with acute appendicitis.  He underwent lap appy without issue.  He was discharged home.    Final Diagnoses:   Principal Problem: Acute appendicitis with localized peritonitis, without perforation, abscess, or gangrene       Discharged Condition: Good    Disposition: Home or Self Care    Follow Up/Patient Instructions: Follow up with Dr. Berger in 2 weeks    Medications:  Reconciled Home Medications:      Medication List        START taking these medications      ketorolac 10 mg tablet  Commonly known as: TORADOL  Take 1 tablet (10 mg total) by mouth every 6 (six) hours as needed.     oxyCODONE-acetaminophen 5-325 mg per tablet  Commonly known as: PERCOCET  Take 1 tablet by mouth every 6 (six) hours as needed for Pain.            CONTINUE taking these medications      azelastine 137 mcg (0.1 %) nasal spray  Commonly known as: ASTELIN  2 sprays (274 mcg total) by Nasal route 2 (two) times daily as needed for Rhinitis.     dextroamphetamine-amphetamine 30 mg Tab  Take 1 tablet (30 mg total) by mouth 2 (two) times daily. For diagnosis code F90.9  Start taking on: February 1, 2024     fluticasone propionate 50 mcg/actuation nasal spray  Commonly known as: FLONASE  2 sprays (100 mcg total) by Each Nostril route once daily.     mupirocin 2 % ointment  Commonly known as: BACTROBAN  Apply topically 2 (two) times daily as needed.     NURTEC 75 mg odt  Generic drug: rimegepant  Take 1 tablet (75 mg total) by mouth daily as needed for headache. No more than 3 days use in week.     PREVIDENT 5000 ORTHO DEFENSE 1.1 % Pste  Generic drug: fluoride  (sodium)  use as directed     sildenafiL 100 MG tablet  Commonly known as: VIAGRA  Take 1 tablet (100 mg total) by mouth daily as needed for Erectile Dysfunction.            Discharge Procedure Orders   Ice to affected area     Lifting restrictions   Order Comments: 20 lbs x 3 weeks     No dressing needed   Order Comments: Ok to shower in 24 hours, no bathing or swimming x 2 weeks      Follow-up Information       Skyler Berger MD Follow up on 1/5/2024.    Specialty: General Surgery  Why: at 2:40pm; general surgery post op appointment  Contact information:  [address]  [address] [address] [phone]                                 Skyler Berger M.D., F.A.C.S.  Fgdqbv-Yxqlshdbl-Brgzjsm and General Surgery  [facility]

## 2024-01-03 LAB
FINAL PATHOLOGIC DIAGNOSIS: NORMAL
GROSS: NORMAL
Lab: NORMAL

## 2024-01-05 ENCOUNTER — OFFICE VISIT (OUTPATIENT)
Dept: SURGERY | Facility: CLINIC | Age: 34
End: 2024-01-05
Payer: COMMERCIAL

## 2024-01-05 VITALS
BODY MASS INDEX: 27.39 KG/M2 | HEART RATE: 97 BPM | HEIGHT: 68 IN | DIASTOLIC BLOOD PRESSURE: 86 MMHG | SYSTOLIC BLOOD PRESSURE: 132 MMHG | WEIGHT: 180.75 LBS

## 2024-01-05 DIAGNOSIS — K35.30 ACUTE APPENDICITIS WITH LOCALIZED PERITONITIS, WITHOUT PERFORATION, ABSCESS, OR GANGRENE: Primary | ICD-10-CM

## 2024-01-05 PROCEDURE — 1159F MED LIST DOCD IN RCRD: CPT | Mod: CPTII,S$GLB,, | Performed by: SURGERY

## 2024-01-05 PROCEDURE — 3079F DIAST BP 80-89 MM HG: CPT | Mod: CPTII,S$GLB,, | Performed by: SURGERY

## 2024-01-05 PROCEDURE — 1160F RVW MEDS BY RX/DR IN RCRD: CPT | Mod: CPTII,S$GLB,, | Performed by: SURGERY

## 2024-01-05 PROCEDURE — 3075F SYST BP GE 130 - 139MM HG: CPT | Mod: CPTII,S$GLB,, | Performed by: SURGERY

## 2024-01-05 PROCEDURE — 99024 POSTOP FOLLOW-UP VISIT: CPT | Mod: S$GLB,,, | Performed by: SURGERY

## 2024-01-05 PROCEDURE — 99999 PR PBB SHADOW E&M-EST. PATIENT-LVL III: CPT | Mod: PBBFAC,,, | Performed by: SURGERY

## 2024-01-05 NOTE — PROGRESS NOTES
[facility]  POST-OP NOTE    HPI: Lance Andrade is a 33 y.o. male s/p lap appy.  Doing well.  No complaints.  Pre-op symptoms resolved.      VITALS:  Vitals:    01/05/24 1533   BP: 132/86   Pulse: 97       PHYSICAL EXAM:  GEN: NAD  HEENT: NCAT  ABD: incisions C/D/I    PATHOLOGY:  Reviewed      ASSESSMENT & PLAN:  33 y.o. male s/p lap appy.  RTC as needed.  Ok to resume normal activity.       Skyler Berger M.D., F.A.C.S.  Hcatnq-Akrduuotb-Jzvnher and General Surgery  [facility]

## 2024-01-12 ENCOUNTER — PROCEDURE VISIT (OUTPATIENT)
Dept: NEUROLOGY | Facility: CLINIC | Age: 34
End: 2024-01-12
Payer: COMMERCIAL

## 2024-01-12 VITALS
SYSTOLIC BLOOD PRESSURE: 140 MMHG | HEIGHT: 68 IN | HEART RATE: 100 BPM | RESPIRATION RATE: 17 BRPM | BODY MASS INDEX: 27.39 KG/M2 | DIASTOLIC BLOOD PRESSURE: 85 MMHG | WEIGHT: 180.75 LBS | TEMPERATURE: 97 F

## 2024-01-12 DIAGNOSIS — G43.719 INTRACTABLE CHRONIC MIGRAINE WITHOUT AURA AND WITHOUT STATUS MIGRAINOSUS: Primary | ICD-10-CM

## 2024-01-12 PROCEDURE — 64615 CHEMODENERV MUSC MIGRAINE: CPT | Mod: S$GLB,,, | Performed by: PHYSICIAN ASSISTANT

## 2024-01-12 NOTE — PROCEDURES
Procedures  [facility]  [address] [facility] [address] [place] [address]  Phone:[phone]  Fax: [phone]  Botox Visit, #7    Chief Complaint   Patient presents with    Botulinum Toxin Injection         A/P:        ICD-10-CM ICD-9-CM   1. Intractable chronic migraine without aura and without status migrainosus  G43.719 346.71     Botox for migraine    Historically: Patient meets the criteria for chronic migraine. In summary, He has headaches/migraines >15 days per month  and last >4 hours if untreated. Specifics of duration, frequency and strength are listed in office visit HPI's.  This pattern has continued for >3 months.  He has failed at least three preventive medications (full list of medications is listed in office notes of Therapies tried in past)  I have therefore recommended a trial of Botox via the PREEMPT protocol for migraine prophylaxis.We schedule regular follow up intervals to check on status.     PROCEDURE NOTE:  BOTOX injection is indicated for the prophylaxis of headaches in adult patients with chronic  migraine. Patient meets indications for BOTOX therapy.  Potential risks and benefits were reviewed. Side effects including, but not limited to, potential  systemic allergic reactions of the anaphylactic type as well as local injection site reactions of  blepharoptosis, diplopia, infection, bleeding, pain, redness and bruising were reviewed. The  potential for headaches and/or neck pain post procedure were reviewed.  The patient's questions were answered. The patient signed a consent form. Patient  understands that depending on their insurance carrier, there may be a copay for this treatment.  BOTOX was reconstituted using  two 100 unit vials and diluted with 4 mL of sterile saline.  BOTOX was injected as per the PREEMPT trial injection paradigm with dose administered as 5  unit intramuscular (IM) injections per site using a sterile, 30-gauge 0.5 inch  needle as follows:  Muscle Dose, # of Sites   10 units divided in 2 sites  Procerus 5 units in 1 site  Frontalis 20 units divided in 4 sites  Temporalis 40 units divided in 8 sites  Occipitalis 30 units divided in 6 sites  Cervical paraspinal 20 units divided in 4 sites  Trapezius 30 units divided in 6 sites  Each site was cleaned with alcohol prior to injection. A total dose of 155 units were injected. 45  units were discarded/wasted.      The patient tolerated the procedure well with no immediate complications.  MEDICATION INFO:  NDC 7636-3109-62   Lot # [id number]  Exp 03/2026      As aside:  >70% relief from the injections       Follow up in 3 months for repeat injection, we also have interspersed office visits scheduled to ensure efficacy of botox sessions/check on patient.       Syed Stevens MPA, PA-C  Attending available-Dr Willie MD           Personal Protective Equipment:    Personal Protective Equipment was used during this encounter including;   non latex gloves.     01/12/2024      CC: Jerry Navarro MD

## 2024-03-01 PROBLEM — Z90.49 HISTORY OF LAPAROSCOPIC APPENDECTOMY: Status: ACTIVE | Noted: 2024-03-01

## 2024-04-08 ENCOUNTER — PROCEDURE VISIT (OUTPATIENT)
Dept: NEUROLOGY | Facility: CLINIC | Age: 34
End: 2024-04-08
Payer: COMMERCIAL

## 2024-04-08 VITALS
HEART RATE: 80 BPM | RESPIRATION RATE: 17 BRPM | DIASTOLIC BLOOD PRESSURE: 83 MMHG | WEIGHT: 176.81 LBS | SYSTOLIC BLOOD PRESSURE: 133 MMHG | BODY MASS INDEX: 26.8 KG/M2 | HEIGHT: 68 IN | TEMPERATURE: 98 F

## 2024-04-08 DIAGNOSIS — G43.719 INTRACTABLE CHRONIC MIGRAINE WITHOUT AURA AND WITHOUT STATUS MIGRAINOSUS: Primary | ICD-10-CM

## 2024-04-08 PROCEDURE — 64615 CHEMODENERV MUSC MIGRAINE: CPT | Mod: S$GLB,,, | Performed by: PHYSICIAN ASSISTANT

## 2024-04-08 NOTE — PROCEDURES
Procedures  [facility]  [address] [facility] [address] [place] [address]  Phone:[phone]  Fax: [phone]  Botox Visit, #8    Chief Complaint   Patient presents with    Botulinum Toxin Injection         A/P:        ICD-10-CM ICD-9-CM   1. Intractable chronic migraine without aura and without status migrainosus  G43.719 346.71     Botox for migraine    Historically: Patient meets the criteria for chronic migraine. In summary, He has headaches/migraines >15 days per month  and last >4 hours if untreated. Specifics of duration, frequency and strength are listed in office visit HPI's.  This pattern has continued for >3 months.  He has failed at least three preventive medications (full list of medications is listed in office notes of Therapies tried in past)  I have therefore recommended a trial of Botox via the PREEMPT protocol for migraine prophylaxis.We schedule regular follow up intervals to check on status.     PROCEDURE NOTE:  BOTOX injection is indicated for the prophylaxis of headaches in adult patients with chronic  migraine. Patient meets indications for BOTOX therapy.  Potential risks and benefits were reviewed. Side effects including, but not limited to, potential  systemic allergic reactions of the anaphylactic type as well as local injection site reactions of  blepharoptosis, diplopia, infection, bleeding, pain, redness and bruising were reviewed. The  potential for headaches and/or neck pain post procedure were reviewed.  The patient's questions were answered. The patient signed a consent form. Patient  understands that depending on their insurance carrier, there may be a copay for this treatment.  BOTOX was reconstituted using  two 100 unit vials and diluted with 4 mL of sterile saline.  BOTOX was injected as per the PREEMPT trial injection paradigm with dose administered as 5  unit intramuscular (IM) injections per site using a sterile, 30-gauge 0.5 inch  needle as follows:  Muscle Dose, # of Sites   10 units divided in 2 sites  Procerus 5 units in 1 site  Frontalis 20 units divided in 4 sites  Temporalis 40 units divided in 8 sites  Occipitalis 30 units divided in 6 sites  Cervical paraspinal 20 units divided in 4 sites  Trapezius 30 units divided in 6 sites  Each site was cleaned with alcohol prior to injection. A total dose of 155 units were injected. 45  units were discarded/wasted.      The patient tolerated the procedure well with no immediate complications.  MEDICATION INFO:  NDC 0164-2704-23   Lot # [id number]  Exp 04/2026      As aside:  >70% relief from the injections       Follow up in 3 months for repeat injection, we also have interspersed office visits scheduled to ensure efficacy of botox sessions/check on patient.       Syed Stevens MPA, PA-C  Attending available-Dr Willie MD           Personal Protective Equipment:    Personal Protective Equipment was used during this encounter including;   non latex gloves.     04/08/2024      CC: Jerry Navarro MD

## 2024-07-01 ENCOUNTER — PROCEDURE VISIT (OUTPATIENT)
Dept: NEUROLOGY | Facility: CLINIC | Age: 34
End: 2024-07-01
Payer: COMMERCIAL

## 2024-07-01 ENCOUNTER — TELEPHONE (OUTPATIENT)
Dept: NEUROLOGY | Facility: CLINIC | Age: 34
End: 2024-07-01

## 2024-07-01 VITALS
SYSTOLIC BLOOD PRESSURE: 137 MMHG | BODY MASS INDEX: 27.76 KG/M2 | HEART RATE: 79 BPM | RESPIRATION RATE: 17 BRPM | HEIGHT: 68 IN | TEMPERATURE: 97 F | WEIGHT: 183.19 LBS | DIASTOLIC BLOOD PRESSURE: 80 MMHG

## 2024-07-01 DIAGNOSIS — G43.719 INTRACTABLE CHRONIC MIGRAINE WITHOUT AURA AND WITHOUT STATUS MIGRAINOSUS: Primary | ICD-10-CM

## 2024-07-01 RX ORDER — UBROGEPANT 100 MG/1
TABLET ORAL
Qty: 16 TABLET | Refills: 4 | Status: SHIPPED | OUTPATIENT
Start: 2024-07-01

## 2024-07-01 NOTE — PROCEDURES
Procedures  [facility]  [address] [facility] [address] [place] [address]  Phone:[phone]  Fax: [phone]  Botox Visit, #9    Chief Complaint   Patient presents with    Botulinum Toxin Injection         A/P:        ICD-10-CM ICD-9-CM   1. Intractable chronic migraine without aura and without status migrainosus  G43.719 346.71     Botox for migraine    Historically: Patient meets the criteria for chronic migraine. In summary, He has headaches/migraines >15 days per month  and last >4 hours if untreated. Specifics of duration, frequency and strength are listed in office visit HPI's.  This pattern has continued for >3 months.  He has failed at least three preventive medications (full list of medications is listed in office notes of Therapies tried in past)  I have therefore recommended a trial of Botox via the PREEMPT protocol for migraine prophylaxis.We schedule regular follow up intervals to check on status.     PROCEDURE NOTE:  BOTOX injection is indicated for the prophylaxis of headaches in adult patients with chronic  migraine. Patient meets indications for BOTOX therapy.  Potential risks and benefits were reviewed. Side effects including, but not limited to, potential  systemic allergic reactions of the anaphylactic type as well as local injection site reactions of  blepharoptosis, diplopia, infection, bleeding, pain, redness and bruising were reviewed. The  potential for headaches and/or neck pain post procedure were reviewed.  The patient's questions were answered. The patient signed a consent form. Patient  understands that depending on their insurance carrier, there may be a copay for this treatment.  BOTOX was reconstituted using  two 100 unit vials and diluted with 4 mL of sterile saline.  BOTOX was injected as per the PREEMPT trial injection paradigm with dose administered as 5  unit intramuscular (IM) injections per site using a sterile, 30-gauge 0.5 inch  needle as follows:  Muscle Dose, # of Sites   10 units divided in 2 sites  Procerus 5 units in 1 site  Frontalis 20 units divided in 4 sites  Temporalis 40 units divided in 8 sites  Occipitalis 30 units divided in 6 sites  Cervical paraspinal 20 units divided in 4 sites  Trapezius 30 units divided in 6 sites  Each site was cleaned with alcohol prior to injection. A total dose of 155 units were injected. 45  units were discarded/wasted.      The patient tolerated the procedure well with no immediate complications.  MEDICATION INFO:  NDC 8479-9143-29   Lot # [id number]  Exp04/2026      As aside:  >70% relief from the injections  Stopped nurtec, felt not working, try ubrelvy instead, discussed adv effects/dosing, he agreed  Presented to appt after appt finished. Able to work him later.        Follow up in 3 months for repeat injection, we also have interspersed office visits scheduled to ensure efficacy of botox sessions/check on patient.       Syed Stevens MPA, PA-C  Attending available-Dr Willie MD           Personal Protective Equipment:    Personal Protective Equipment was used during this encounter including;   non latex gloves.     07/01/2024      CC: Jerry Navarro MD

## 2024-07-01 NOTE — TELEPHONE ENCOUNTER
The prior authorization for Lance Andrade's Ubrelvy prescription has been APPROVED FROM 7/1/24 TO 10/1/24 with copayment of $0.00.

## 2024-07-26 ENCOUNTER — PATIENT MESSAGE (OUTPATIENT)
Dept: NEUROLOGY | Facility: CLINIC | Age: 34
End: 2024-07-26
Payer: COMMERCIAL

## 2024-07-26 RX ORDER — DIVALPROEX SODIUM 250 MG/1
250 TABLET, FILM COATED, EXTENDED RELEASE ORAL DAILY
Qty: 7 TABLET | Refills: 0 | Status: SHIPPED | OUTPATIENT
Start: 2024-07-26 | End: 2025-07-26

## 2024-07-27 ENCOUNTER — ON-DEMAND VIRTUAL (OUTPATIENT)
Dept: URGENT CARE | Facility: CLINIC | Age: 34
End: 2024-07-27
Payer: COMMERCIAL

## 2024-07-27 ENCOUNTER — NURSE TRIAGE (OUTPATIENT)
Dept: ADMINISTRATIVE | Facility: CLINIC | Age: 34
End: 2024-07-27
Payer: COMMERCIAL

## 2024-07-27 DIAGNOSIS — G43.001 MIGRAINE WITHOUT AURA AND WITH STATUS MIGRAINOSUS, NOT INTRACTABLE: Primary | ICD-10-CM

## 2024-07-27 PROCEDURE — 99212 OFFICE O/P EST SF 10 MIN: CPT | Mod: 95,,, | Performed by: NURSE PRACTITIONER

## 2024-07-27 NOTE — TELEPHONE ENCOUNTER
"Pt states that he has headaches and spoke with provider on yesterday. Pt states that Depakote 250 mg as ordered was sent to pharmacy that is closed today. States that he didn't  prescription on yesterday. Requesting that med be [facility] to [facility] [address] in [place], [place]. Provider on call listed for [facility] Pt advised to f/u with [facility] and accepted. Encounter routed to provider.   Reason for Disposition   [1] Prescription not at pharmacy AND [2] was prescribed by PCP recently  (Exception: [facility] has access to EMR and prescription is recorded there. Go to Home Care and confirm for pharmacy.)    Protocols used: Medication Refill and Renewal Call-A-AH    "

## 2024-07-27 NOTE — PROGRESS NOTES
Subjective:      Patient ID: Lance Andrade is a 33 y.o. male.    Vitals:  vitals were not taken for this visit.     Chief Complaint: Medication Problem      Visit Type: TELE AUDIOVISUAL    Present with the patient at the time of consultation: TELEMED PRESENT WITH PATIENT: None        Past Medical History:   Diagnosis Date    a Congenital pulmonary valve stenosis     Was Cleared By Pediatric Cardiology; Did Not Require Any Corrective Sx    b Elevated BP W/O DX Of HTN     1/24/19 RXd A Low Salt Diet And Recommended He Stop OTC Creatine Muscle Building Supplements    b Family H/O Hypertension     Both Of Parents    j External Hemorrhoids With H/O Bleeding     3/20/23 RXd Triamcinolone Cream BID PRN; 3/20/23 Referred To Dr. LENA scott Gluteal And Presacral Folliculitis     3/20/23 RXd Topical Bactroban BID PRN, And Also Nasally BID X 5 Days    j H/O Laparoscopic Appendectomy In 12/2023     Dr. Skyler Berger In [place]    k Erectile Dysfunction     10/12/15 Testosterone = Normal    k Left varicocele S/P Ligation     k Urinary Fequency     7/16/20 Ordered A U/A And UCx    l Chronic Recurrent Neck Pain     6/20/22 Referred To Dr. Keith Chu ([facility]); 6/20/22 C-Spine XRays = Unremarkable (See Report); Outpatient PT Has Helped A Bit In The Past; He Sees A Chiropracter For This; 09/2018 C-Spine MRI = Was Reportedly Unremarkable    m Family H/O Cerebral Aneurysm     6/20/22 Brain MRA = Ordered    m Migraines     7/16/20 And 7/26/18 Referred To Dr. Megan Nunes, And RXd Imitrex PRN    n Attention Deficit Disorder ###    7/10/17 Increased His 30 Mg Adderall Bid To Adderall 30 Mg Bid    n Hx Of Opiod Abuse In 2012 ###    Had A  And Was In A Tx Program Then    n Therapeutic Drug Monitoring     o Allergic Rhinosinusitis     6/20/22 Referred To Dr. Liam Garcia    o H/O Nasal Septoplasty In 2013     This Helped With His Migraines    o Right Lower Eyelid Lesions     1/18/17 Referred To Dr. Andrews Dodd; HSV  ??    q BLE Varicose Veins     Wellness Visit 6/3/2024      Past Surgical History:   Procedure Laterality Date    DENTAL SURGERY      LAPAROSCOPIC APPENDECTOMY N/A 12/22/2023    Procedure: APPENDECTOMY, LAPAROSCOPIC;  Surgeon: Skyler Berger MD;  Location: [facility];  Service: General;  Laterality: N/A;    SINUS SURGERY      TONSILLECTOMY      WISDOM TOOTH EXTRACTION      WRIST SURGERY      left     Review of patient's allergies indicates:   Allergen Reactions    Penicillins Rash     Current Outpatient Medications on File Prior to Visit   Medication Sig Dispense Refill    azelastine (ASTELIN) 137 mcg (0.1 %) nasal spray 2 sprays (274 mcg total) by Nasal route 2 (two) times daily as needed for Rhinitis. 90 mL 3    [START ON 8/28/2024] dextroamphetamine-amphetamine 30 mg Tab Take 1 tablet (30 mg total) by mouth 2 (two) times a day. 60 tablet 0    divalproex ER (DEPAKOTE ER) 250 MG 24 hr tablet Take 1 tablet (250 mg total) by mouth once daily. Taper: day 1: 2 pills am/2 pills pm, day 2: 1 pill bid, day 3: 1 pill qhs, then off. 7 tablet 0    fluticasone propionate (FLONASE) 50 mcg/actuation nasal spray 2 sprays (100 mcg total) by Each Nostril route once daily. 48 g 3    mupirocin (BACTROBAN) 2 % ointment Apply topically 2 (two) times daily as needed. 30 g 1    PREVIDENT 5000 ORTHO DEFENSE 1.1 % Pste use as directed      sildenafiL (VIAGRA) 100 MG tablet Take 1 tablet (100 mg total) by mouth daily as needed for Erectile Dysfunction. 90 tablet 1    ubrogepant (UBRELVY) 100 mg tablet Take 1 tablet by mouth at onset of headache, second tablet 2 hours later if needed no more than 3 days use in week 16 tablet 4     No current facility-administered medications on file prior to visit.     Family History   Problem Relation Name Age of Onset    Allergic rhinitis Father      Angioedema Neg Hx      Asthma Neg Hx      Atopy Neg Hx      Immunodeficiency Neg Hx      Eczema Neg Hx      Urticaria Neg Hx             Ohs Peq [facility] Intake     7/27/2024  2:52 PM CDT - Filed by Patient   What is your current physical address in the event of a medical emergency? [address] [place] [address]   Are you able to take your vital signs? Yes   Systolic Blood Pressure: 130   Diastolic Blood Pressure: 86   Weight: 175   Height: 68   Pulse: 70   Temperature: 98   Respiration rate: 16   Pulse Oxygen:    Please attach any relevant images or files          34 yo male with c/o medication problem. He states he sees a PA for c/o headache. He states he states he saw them yesterday and they sent in a medication but he states he slept in and was not able to pick it up and now closed.         Constitution: Negative.   HENT: Negative.     Cardiovascular: Negative.    Eyes: Negative.    Respiratory: Negative.     Gastrointestinal: Negative.  Negative for bowel incontinence.   Endocrine: negative.   Genitourinary: Negative.  Negative for dysuria, flank pain, bladder incontinence and pelvic pain.   Musculoskeletal: Negative.  Negative for pain, abnormal ROM of joint and back pain.   Skin: Negative.    Allergic/Immunologic: Negative.    Neurological: Negative.    Hematologic/Lymphatic: Negative.    Psychiatric/Behavioral: Negative.          Objective:   The physical exam was conducted virtually.  LOCATION OF PATIENT home  Physical Exam   Constitutional: He is oriented to person, place, and time. He appears well-developed.   HENT:   Head: Normocephalic and atraumatic.   Ears:   Right Ear: Hearing, tympanic membrane and external ear normal.   Left Ear: Hearing, tympanic membrane and external ear normal.   Nose: Nose normal.   Mouth/Throat: Uvula is midline, oropharynx is clear and moist and mucous membranes are normal.   Eyes: Conjunctivae and EOM are normal. Pupils are equal, round, and reactive to light.   Neck: Neck supple.   Cardiovascular: Normal rate.   Pulmonary/Chest: Effort normal and breath sounds normal.   Musculoskeletal: Normal range of motion.          General: Normal range of motion.   Neurological: He is alert and oriented to person, place, and time.   Skin: Skin is warm.   Psychiatric: His behavior is normal. Thought content normal.   Nursing note and vitals reviewed.      Assessment:     1. Migraine without aura and with status migrainosus, not intractable        Plan:   I explained to patient that it was already filled and processed through pharmacy and that I could not send another rx.     Migraine without aura and with status migrainosus, not intractable

## 2024-07-29 ENCOUNTER — OFFICE VISIT (OUTPATIENT)
Dept: NEUROLOGY | Facility: CLINIC | Age: 34
End: 2024-07-29
Payer: COMMERCIAL

## 2024-07-29 VITALS
WEIGHT: 179.56 LBS | HEART RATE: 76 BPM | DIASTOLIC BLOOD PRESSURE: 77 MMHG | BODY MASS INDEX: 27.21 KG/M2 | SYSTOLIC BLOOD PRESSURE: 124 MMHG | RESPIRATION RATE: 16 BRPM | HEIGHT: 68 IN

## 2024-07-29 DIAGNOSIS — R11.0 NAUSEA: ICD-10-CM

## 2024-07-29 DIAGNOSIS — M79.18 MYOFASCIAL PAIN: ICD-10-CM

## 2024-07-29 DIAGNOSIS — G43.719 INTRACTABLE CHRONIC MIGRAINE WITHOUT AURA AND WITHOUT STATUS MIGRAINOSUS: Primary | ICD-10-CM

## 2024-07-29 PROCEDURE — 99999 PR PBB SHADOW E&M-EST. PATIENT-LVL IV: CPT | Mod: PBBFAC,,, | Performed by: PHYSICIAN ASSISTANT

## 2024-07-29 PROCEDURE — 3008F BODY MASS INDEX DOCD: CPT | Mod: CPTII,S$GLB,, | Performed by: PHYSICIAN ASSISTANT

## 2024-07-29 PROCEDURE — 3078F DIAST BP <80 MM HG: CPT | Mod: CPTII,S$GLB,, | Performed by: PHYSICIAN ASSISTANT

## 2024-07-29 PROCEDURE — 1159F MED LIST DOCD IN RCRD: CPT | Mod: CPTII,S$GLB,, | Performed by: PHYSICIAN ASSISTANT

## 2024-07-29 PROCEDURE — 1160F RVW MEDS BY RX/DR IN RCRD: CPT | Mod: CPTII,S$GLB,, | Performed by: PHYSICIAN ASSISTANT

## 2024-07-29 PROCEDURE — 99214 OFFICE O/P EST MOD 30 MIN: CPT | Mod: S$GLB,,, | Performed by: PHYSICIAN ASSISTANT

## 2024-07-29 PROCEDURE — 3074F SYST BP LT 130 MM HG: CPT | Mod: CPTII,S$GLB,, | Performed by: PHYSICIAN ASSISTANT

## 2024-07-29 RX ORDER — ONDANSETRON 8 MG/1
8 TABLET, ORALLY DISINTEGRATING ORAL EVERY 12 HOURS PRN
Qty: 20 TABLET | Refills: 6 | Status: SHIPPED | OUTPATIENT
Start: 2024-07-29

## 2024-07-29 RX ORDER — PROCHLORPERAZINE MALEATE 10 MG
TABLET ORAL
Qty: 20 TABLET | Refills: 0 | Status: SHIPPED | OUTPATIENT
Start: 2024-07-29

## 2024-07-29 NOTE — PROGRESS NOTES
[facility]  [address] [facility] [address]  [place] [address]  Phone:[phone]  Fax: [phone]  Follow up visit    Patient Name: Lance Andrade  : 1990  MRN:  [id number]  Today: 2024   LOV:  2024 for botox #9  chief complaint: No chief complaint on file.      PCP: Jerry Navarro MD.    Assessment:   Lance Andrade is a 33 y.o. with a PMHx of: substance abuse (per chart review, including opiate), migraines, ADHD and neck pain  whom presents solo in follow up. HA appear to be chronic migrainous resistant to treatment. Botox has improved his HA. Recently more HA, appearing to be myofascial/cervicogenic in nature. Nausea with his HA.     Review:    ICD-10-CM ICD-9-CM   1. Intractable chronic migraine without aura and without status migrainosus  G43.719 346.71   2. Myofascial pain  M79.18 729.1   3. Nausea  R11.0 787.02               Plan:   Continue to track HA   Offered imaging study d/t recent up tic in HA frequency, he would like to wait and see if improvement with HA with medication adjustment.     For HA Prevention:  1 Patient meets the criteria for chronic migraine. In summary, He has  migraines >15 days per month  and last >4 hours if untreated. Specifics of duration, frequency and strength are listed in the HPI (please refer to this section).  This pattern has continued for >3 months.  He has failed at least three preventive medications (full list of medications is listed below in the HPI under Therapies tried in past, but for ease of reference topamax, depakote, propranolol, zanaflex, botox, etc)  I have therefore recommended a trial of Botox via the PREEMPT protocol for migraine prophylaxis.   We discussed what to expect on procedure day at length, wear old or loose fitting clothing (if possible, merely to keep work clothes from getting any blood or being wrinkled), no make up (if applicable), eat meals/stay well hydrated and secure a ride if  necessary. Also, discussed it can take up to 2-3 sessions of botox to get results desired. Lastly, we discussed procedure at length, 31 injections done in the office, potential complications not limited to muscle weakness, respiratory issues, or worst case scenario-death. The patient voiced understanding and wished to move forward.  Muscles to be injected:   10 units divided in 2 sites  Procerus 5 units in 1 site  Frontalis 20 units divided in 4 sites  Temporalis 40 units divided in 8 sites  Occipitalis 30 units divided in 6 sites  Cervical paraspinal 20 units divided in 4 sites  Trapezius 30 units divided in 6 sites  A total dose of 155 units of botox to be used with  45 units to be discarded/wasted (unavoidable).          -HA have generally done well until just recently, overall >50% improvement in migraine reduction days. Move forward with botox #10     2 PT ordered     3 If HA persist/worsen, may consider adding cGRP (briefly discussed)     For HA :  Nurtec    Wrote for zofran, discussed adv effects/dosing, he agreed     To break up Headaches:  Depakote taper (in process)   Compazine taper written, discussed adv effects/dosing, he agreed  Other:   N/a         All test results as well as any necessary instructions were reviewed and discussed with patient.    Review:  Orders Placed This Encounter    Ambulatory referral/consult to Physical/Occupational Therapy    prochlorperazine (COMPAZINE) 10 MG tablet    ondansetron (ZOFRAN-ODT) 8 MG TbDL             Patient to return to PCP/other specialists for all other problems  Patient to continue on all medications as Rx'd  Office notes available online   RTO- botox  10  The patient indicates understanding of these issues and agrees to the plan.    HPI:   Lance Andrade is a 33 y.o.right handed, male with a PMHx of: substance abuse (per chart review, including opiate), migraines, ADHD and neck pain  whom presents solo in follow up.     At last  "visit: botox #9 given. Wrote that he was having more HA, sent in a depakote taper. Has ubrelvy for HA.   HA every other day for past few weeks         -pain in the neck/sinues then the LEFT side of the head feels bad         -states he is not working out much but has taken up boxing lessons          -1-3 hours discomfort, having to use ubrelvy more often (though effective)          -feeling nausea when he has HA  Had cold/sinus issue 4-6 weeks ago, no recent fevers         -no rashes, no vision changes  No side effects from botox  Botox has historically worked well, just past few weeks more HA   Wasn't able to  depakote (d/t pharmacy hours) until today. So far, no side effects       At last visit: botox #6 given, has nurtec for HA .   Presents late for appt today  1-2 migraines at most in past month, last migraine was 2 weeks ago  Nurtec is abortive when he uses it  No side effects from botox   Feeling great overall      From previous visits:, botox 1 given. Has nurtec for HA . Referral to back and spine center.   3 HD since last botox visit (historically was 30 HD/30)   No side effects from botox  Last HA was this past week, alcohol possibly triggered   Nurtec work well   Pain along LEFT hemicrania   Average duration: 2 -4 hours  Pain is 8/10       From previous visits:  Presents late to appt (>15 mins), still seen.     nurtec, maxalt and prednisone written.   Still having daily HA (30 HD/30)  LEFT side of the head  Average pain: 7-8/10  Pain lasts on average a few hours (takes something near daily for them), if he didn't take something would be ALL day   No vision changes  Steroids didn't make HA go away but may have "eased" some of the pain  maxalt and nurtec take HA away for the day   Last COHEN yesterday   Has stopped the maxalt  "I am going through nurtec"  Feels his neck pain and his sinus problems are causing a lot of his HA   No new weakness, no new falls  No other new concerns   No HA " "at present     At last visit: started on verapamil and nurtec.   Self stopped verapamil  Felt nurtec helped-had samples of this (years ago)    HA came back 2-3 months ago-no trigger identified   Location: left hemicrania  Severity: moderate-severe  Duration:2-3 hours as he takes something for it (OTC daily), or if too bad will take imitrex or muscle relaxants. The imitrex at 50 mg isn't very effective. Will also take large amounts of caffeine to break up the HA   Frequency:daily (30 HD/30)    Associated factors (bolded positive) WITH HA ( or migraine): Nausea, vomiting, photophobia, phonophobia, tinnitus, scalp pain, vision loss, diplopia, scintillations, eye pain, jaw pain, weakness?    Tried:caffeine, tylenol, imitrex, muscle relaxers   Triggers (in bold): neck pain,  stress, lack of sleep, too much caffeine, too little caffeine, weather change, seasonal change, strong odours, bright lights, sunlight  Last HA: 2 night ago   Positives in bold: Hx of Kidney Stones, asthma, GI bleed, osteoporosis, CAD/MI, CVA/TIA, DM  <---denies  Imaging on file: none of brain in past 3 years at [facility]   Therapies tried in past: (failures to be marked, if known---why did it fail?)  Topamax  Depakote  Propranolol  treximet  imitrex  maxalt  relpax.   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 Positive FHX of migraine affecting his mother. The frequency of the attacks is quite variable, ranging from daily to periods of headache freedom lasting weeks. He reports the headaches "come in clusters." He was taking analgesics, naproxen, ibuprofen, goodie powders QID on a daily basis. His own reading made him aware of Medication Overuse Headache. He tapered himself off and gradually got better. He reports very severe pain in " the process but he endured it and is now a lot better.He has tried a number of preventives including Topamax, Depakote, Propranolol. For acute treatment he has tried triptans, all associated with side effects, including treximet, imitrex, maxalt, relpax. Fioricet only partially effective. Per records, he has history of substance abuse, and was monitored for months in [id number].    Headache questionnaire     1. When did your Headaches start?               As long as I can remember        2. Where are your headaches located?              Temple/side mostly left        3. Your headache's characteristics:              Excruciating, Pressure, Throbbing, Pounding        4. How long does the headache last?              Minutes hours        5. How often does the headache occur?              daily, weekly and cluster        6. Are your headaches preceded or accompanied by other symptoms? yes              If yes, please describe.  Sinus pressure, neck pain         7. Does the headache awaken you at night? yes              If so, how often? Most nights when I am having headaches           8. Please cristina the word that best describes your headache's intensity:               severe        9. Using a scale of 1 through 10, with 0 = no pain and 10 = the worst pain:              What score is your headache now? 0              What score is your headache at its worst? 10              What score is your headache at its best? 0           10. Possible associated headache symptoms:  [x]  Sensitivity to light              [x] Dizziness                [x] Nasal or sinus pressure/ pain          [x] Sensitivity to noise             [] Vertigo                    [x] Problems with concentration  [x] Sensitivity to smells           [] Ringing in ears       [x] Problems with memory                    [x] Blurred vision                     [x] Irritability                 [x] Problems with task completion   [] Double vision                      [x] Anger                      [x]  Problems with relaxation  [x] Loss of appetite                  [x] Anxiety                   [x] Neck tightness, Neck pain  [x] Nausea                               [x] Nasal congestion  [] Vomiting                                     11. Headache improving factors:  [] Sleep                      [x] Heat  [x] Darkness                [x] Ice  [] Local pressure       [] Menses (period)  [x] Massage                 [] Medications:          12. Headache worsening factors:   [x] Fatigue       [] Sneezing                [] Changes in Weather  [] Light           [x] Bending Over         [x] Stress  [] Noise          [] Ovulation                [] Multiple Sclerosis Flare-Up  [x] Smells        [] Menses                   [] Food   [] Coughing    [x] Alcohol        13. Number of caffeinated drinks per day: 0        14. Number of diet drinks per day:0        15. Have you seen any other [facility] within the last 3 years?  no     Please Check any Medications or Procedures tried/failed for Headache     AED Neuromodulators   MAOIs   Ergot Alkaloids     Acetazolamide (Diamox) []  Phenelzine (Nardil) []  Dihydroergotamine (Migranal) []    Carbamazepine (Tegretol) []  Tranylcypromine (Parnate) []  Ergotamine (Ergomar) []    Gabapentin (Neurontin) [x]  Antihistamine/Serotonergic   Triptans     Lacosamide (Vimpat) []  Cyproheptadine (Periactin) []  Almotriptan (Axert) []    Lamotrigine (Lamictal) []  Antihypertensives   Eletriptan (Relpax) [x]    Levatiracetam (Keppra) []  Atenolol (Tenormin) []  Frovatriptan (Frova) []    Oxcarbazepine (Trileptal) []  Bisoprostol (Zebeta) []  Naratriptan (Amerge) []    Phenobarbital []  Candesartan (Atacand) []  Rizatriptan (Maxalt) [x]        Nebivolol (Bystolic)   Sumatriptan (Imitrex) [x]    Levetiracetam (Keppra)   Cardeilol (Coreg) []  Zolmitriptan (Zomig) [x]    Phenytoin (Dilantin) []  Diltiazem (Cardizem) []  Treximet     Pregabalin (Lyrica)  []  Lisinopril (Prinivil, Zestril) []  Combo Abortives     Primidone (Mysoline) []  Metoprolol (Toprol) []  BC Powder []    Tiagabine (Gabatril) []  Nadolol (Corgard) []  Butalbital and Acetaminophen (Bupap) []    Topiramate (Topamax)  (Trokendi) [x]  Nicardipine (Cardene) []      Vigabatrin (Sabril) []  Nimodipine (Nimotop) []  Butalbital, Acetaminophen, and caffeine (Fioricet) []    Valproic Acid (Depakote) (Divalproex Sodium) [x]  Propranolol (Inderal) [x]      Zonisamide (Zonegran) []  Telmisartan (Micardis) []  Butalbital, Aspirin, and caffeine (Fiorinal) []    Benzodiazepines   Timolol (Blocadren) []      Alprazolam (Xanax) []  Verapamil (Calan, Verelan) []  Butalbital, Caffeine, Acetaminophen, and Codeine (Fioricet with Codeine) [x]    Diazepam (Valium) []  NSAIDs       Lorazepam (Ativan) []  Acetaminophen (Tylenol) []      Clonazepam (Klonopin) []  Acetylsalicylic Acid (Aspirin) []  Butalbital, Caffeine, Aspirin, and Codeine  (Fiorinal with Codeine) []    Antidepressants   Diclofenac (Cambia) []      Amitriptyline (Elavil) []  Ibuprofen (Motrin) [x]      Desipramine (Norpramin) []  Indomethacin (Indocin) []  Aspirin, Caffeine, and Acetaminophen (Excedrin) (Goodys) [x]    Doxepin (Sinequan) []  Ketoprofen (Orudis) []      Fluoxetine (Prozac) []  Ketorolac (Toradol) []  Acetaminophen, Dichloralphenazone, and Isometheptene (Midrin) []    Imipramine (Tofranil) []  Naproxen (Anaprox) (Aleve) [x]      Nortriptyline (Pamelor) []  Meclofenamic Acid (Meclomen) []      Venlafaxine (Effexor) []  Meloxicam (Mobic) []  Procedures     Desvenlafazine (Pristiq) []  Monoclonals   Greater occipital nerve block []    Duloxetine (Cymbalta) []  Eptinezumab []  Cervical, Thoracic, Lumbar radiofrequency ablation []    Trazadone []  Erenumab-aooe (Aimovig) []  Spenopalatine ganglion block []    Wellbutrin []  Galcanezumab (Emgality) []  Occipital neuro stimulation []    Protriptyline (Vivactil) []  Fremanazumab-vfrm (Ajovy)    "Cervical, Thoracic, Lumbar, Caudal Epidural steroid injection [x]    Escitalopram (Lexapro) []  Other []  Sacroiliac joint steroid injection []    Celexa []  Memantine (Namenda) []  Transforaminal epidural steroid injection []        Botox []  Facet joint injections []        Baclofen (Lioresal) []  Cervical, Thoracic, Lumbar medial branch blocks []            Cefaly []            Gamma Core []            Iovera []            Transcranial Magnetic stimulation []                                   "  Medication Reconciliation:   Current Outpatient Medications   Medication Sig Dispense Refill    azelastine (ASTELIN) 137 mcg (0.1 %) nasal spray 2 sprays (274 mcg total) by Nasal route 2 (two) times daily as needed for Rhinitis. 90 mL 3    [START ON 8/28/2024] dextroamphetamine-amphetamine 30 mg Tab Take 1 tablet (30 mg total) by mouth 2 (two) times a day. 60 tablet 0    divalproex ER (DEPAKOTE ER) 250 MG 24 hr tablet Take 1 tablet (250 mg total) by mouth once daily. Taper: day 1: 2 pills am/2 pills pm, day 2: 1 pill bid, day 3: 1 pill qhs, then off. 7 tablet 0    fluticasone propionate (FLONASE) 50 mcg/actuation nasal spray 2 sprays (100 mcg total) by Each Nostril route once daily. 48 g 3    mupirocin (BACTROBAN) 2 % ointment Apply topically 2 (two) times daily as needed. 30 g 1    PREVIDENT 5000 ORTHO DEFENSE 1.1 % Pste use as directed      sildenafiL (VIAGRA) 100 MG tablet Take 1 tablet (100 mg total) by mouth daily as needed for Erectile Dysfunction. 90 tablet 1    ubrogepant (UBRELVY) 100 mg tablet Take 1 tablet by mouth at onset of headache, second tablet 2 hours later if needed no more than 3 days use in week 16 tablet 4     No current facility-administered medications for this visit.     Review of patient's allergies indicates:   Allergen Reactions    Penicillins Rash       PMHx:  Past Medical History:   Diagnosis Date    a Congenital pulmonary valve stenosis     Was Cleared By Pediatric Cardiology; Did Not " Require Any Corrective Sx    b Elevated BP W/O DX Of HTN     1/24/19 RXd A Low Salt Diet And Recommended He Stop OTC Creatine Muscle Building Supplements    b Family H/O Hypertension     Both Of Parents    j External Hemorrhoids With H/O Bleeding     3/20/23 RXd Triamcinolone Cream BID PRN; 3/20/23 Referred To Dr. LENA scott Gluteal And Presacral Folliculitis     3/20/23 RXd Topical Bactroban BID PRN, And Also Nasally BID X 5 Days    j H/O Laparoscopic Appendectomy In 12/2023     Dr. Skyler Berger In [facility] Erectile Dysfunction     10/12/15 Testosterone = Normal    k Left varicocele S/P Ligation     k Urinary Fequency     7/16/20 Ordered A U/A And UCx    l Chronic Recurrent Neck Pain     6/20/22 Referred To Dr. Keith Chu (PM&R); 6/20/22 C-Spine XRays = Unremarkable (See Report); Outpatient PT Has Helped A Bit In The Past; He Sees A Chiropracter For This; 09/2018 C-Spine MRI = Was Reportedly Unremarkable    m Family H/O Cerebral Aneurysm     6/20/22 Brain MRA = Ordered    m Migraines     7/16/20 And 7/26/18 Referred To Dr. Megan Nunes, And RXd Imitrex PRN    n Attention Deficit Disorder ###    7/10/17 Increased His 30 Mg Adderall Bid To Adderall 30 Mg Bid    n Hx Of Opiod Abuse In 2012 ###    Had A  And Was In A Tx Program Then    n Therapeutic Drug Monitoring     o Allergic Rhinosinusitis     6/20/22 Referred To Dr. Liam Garcia    o H/O Nasal Septoplasty In 2013     This Helped With His Migraines    o Right Lower Eyelid Lesions     1/18/17 Referred To Dr. Andrews Dodd; HSV ??    q BLE Varicose Veins     Wellness Visit 6/3/2024      Past Surgical History:   Procedure Laterality Date    DENTAL SURGERY      LAPAROSCOPIC APPENDECTOMY N/A 12/22/2023    Procedure: APPENDECTOMY, LAPAROSCOPIC;  Surgeon: Skyler Berger MD;  Location: [facility];  Service: General;  Laterality: N/A;    SINUS SURGERY      TONSILLECTOMY      WISDOM TOOTH EXTRACTION      WRIST SURGERY      left        Fhx:  Family History   Problem Relation Name Age of Onset    Allergic rhinitis Father      Angioedema Neg Hx      Asthma Neg Hx      Atopy Neg Hx      Immunodeficiency Neg Hx      Eczema Neg Hx      Urticaria Neg Hx         Shx: no tobacco, no etoh, 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, RN   Social History     Socioeconomic History    Marital status: Single   Occupational History    Occupation: Works restaurant(, etc)     Employer: [employer]   Tobacco Use    Smoking status: Former     Types: Vaping with nicotine    Smokeless tobacco: Former    Tobacco comments:     chews tobacco   Substance and Sexual Activity    Alcohol use: Yes     Comment: soc    Drug use: No     Comment: past Hx abuse, denies any active use and in court ordered monitoring program, does not want to have any narcotic postop    Sexual activity: Yes     Partners: Female     Social Determinants of Health     Stress: Stress Concern Present (10/3/2019)    [employer] - Occupational Stress Questionnaire     Feeling of Stress : To some extent           Labs:   Reviewed in chart     Imaging:   Reviewed in chart       Other testing:  Reviewed in chart     Note: I have independently reviewed any/all imaging/labs/tests and agree with the report (s) as documented.  Any discrepancies will be as noted/demarcated by free text.  ANN DELGADO 7/29/2024                     ROS:   Review Of Systems (questions asked, positive or additions in BOLD)  Gen: Weight change, fatigue/malaise, pyrexia   HEENT: Tinnitus, headache,  blurred vision, eye pain, diplopia, photophobia,  nose bleeds, congestion, sore throat, jaw pain, scalp pain, neck stiffness   Card: Palpitations, CP   Pulm: SOB, cough   Vas: Easy bruising, easy bleeding   GI: N/V/D/C, incontinence, hematemesis, hematochezia    : incontinence, hematuria   Endocrine: Temp intolerance, polyuria, polydipsia   M/S: Neck pain, myalgia, back pain, joint pain, falls    Neuro: PER HPI  "  PSY: Memory loss, confusion, depression, anxiety, trouble in sleep, hallucinations          EXAM:   /77   Pulse 76   Resp 16   Ht 5' 8" (1.727 m)   Wt 81.4 kg (179 lb 9 oz)   BMI 27.30 kg/m²    GEN:  NAD   HEENT: NC/AT, NTTP frontalis, TTP occipitalis/trapezius      NEURO:  Mental Status:  Awake, alert and appropriately oriented to time, place, and person.  Normal attention and concentration.  Speech is fluent and appropriate language function (I.e., comprehension)    Cranial Nerves:     Extraocular movements are intact and without nystagmus.  Visual fields are full to confrontation testing.   Facial movement is symmetric.   Hearing is normal. Uvula in midline. FROM of neck in all (6) directions, shoulder shrug symmetrical. Tongue in midline without fasiculation.     Motor:  Antigravity in all limbs  No drift  No resting tremor    DTR:  1+ bilat biceps/brachiorad, 2+ bilat patellar     Gait and Stance: Normal manner of stance and gait function testing.         This document has been electronically signed by Mr. Syed Stevens MPA, PA-C on 7/29/2024, I have personally typed this message using the EMR.       Dr Willie MD  was available during today's visit.   Personal Protective Equipment:    Personal Protective Equipment was used during this encounter including;   non latex gloves.       CC: Jerry Navarro MD                  "

## 2024-08-07 ENCOUNTER — TELEPHONE (OUTPATIENT)
Dept: SURGERY | Facility: CLINIC | Age: 34
End: 2024-08-07
Payer: COMMERCIAL

## 2024-08-08 ENCOUNTER — TELEPHONE (OUTPATIENT)
Dept: SURGERY | Facility: CLINIC | Age: 34
End: 2024-08-08
Payer: COMMERCIAL

## 2024-08-12 ENCOUNTER — TELEPHONE (OUTPATIENT)
Dept: SURGERY | Facility: CLINIC | Age: 34
End: 2024-08-12
Payer: COMMERCIAL

## 2024-08-12 NOTE — TELEPHONE ENCOUNTER
----- Message from Grace Cannon sent at 8/12/2024  1:32 PM CDT -----  Type:  Needs Medical Advice    Who Called: pt  Symptoms (please be specific): third time reaching out DEC  needs provider to say it was medically necessary so the insurance to cover UMR needs please call [phone] please call to let know this has been done, thank you    Would the patient rather a call back or a response via [facility]? call  Best Call Back Number: [phone]   Additional Information:          08/12/2024             1650  Attempted to contact patient regarding the above message. No answer. Left voicemail.

## 2024-08-19 ENCOUNTER — PATIENT MESSAGE (OUTPATIENT)
Dept: NEUROLOGY | Facility: CLINIC | Age: 34
End: 2024-08-19
Payer: COMMERCIAL

## 2024-08-19 ENCOUNTER — PATIENT MESSAGE (OUTPATIENT)
Dept: SURGERY | Facility: CLINIC | Age: 34
End: 2024-08-19
Payer: COMMERCIAL

## 2024-08-19 ENCOUNTER — TELEPHONE (OUTPATIENT)
Dept: NEUROLOGY | Facility: CLINIC | Age: 34
End: 2024-08-19
Payer: COMMERCIAL

## 2024-08-19 DIAGNOSIS — G43.719 INTRACTABLE CHRONIC MIGRAINE WITHOUT AURA AND WITHOUT STATUS MIGRAINOSUS: Primary | ICD-10-CM

## 2024-08-19 DIAGNOSIS — R51.9 WORSENING HEADACHES: ICD-10-CM

## 2024-08-19 RX ORDER — ATOGEPANT 60 MG/1
TABLET ORAL
Qty: 30 TABLET | Refills: 6 | Status: SHIPPED | OUTPATIENT
Start: 2024-08-19

## 2024-08-19 NOTE — TELEPHONE ENCOUNTER
Spoke with pt. Scheduled MRI and informed to go to any [facility] facility to have non fasting labs drawn.

## 2024-08-19 NOTE — TELEPHONE ENCOUNTER
----- Message from Jade Escalante sent at 8/19/2024 12:07 PM CDT -----  Type: Needs Medical Advice  Who Called:  pt  Symptoms (please be specific):  headches  How long has patient had these symptoms:    Pharmacy name and phone #:    [facility] [place] - [address] [place] [address]  Phone: [phone] Fax: [phone]          Best Call Back Number: [phone]    Additional Information: pt is having daily headaches.  He is looking for the preventative medication so he doesn't have such headaches every day  Please call back to advise

## 2024-08-19 NOTE — TELEPHONE ENCOUNTER
Talked to patient, feels HA have worsened despite botox.     Will get MRI brain +/- contrast and pre procedure labs (staff, please call to help arrange).  I discussed I will comment on findings electronically for him.     Added qulipta, discussed adv effects/dosing, he agreed.     Will follow up as scheduled. He was appreciative of the call.     ANN

## 2024-08-21 ENCOUNTER — PATIENT MESSAGE (OUTPATIENT)
Dept: NEUROLOGY | Facility: CLINIC | Age: 34
End: 2024-08-21
Payer: COMMERCIAL

## 2024-09-25 ENCOUNTER — PROCEDURE VISIT (OUTPATIENT)
Dept: NEUROLOGY | Facility: CLINIC | Age: 34
End: 2024-09-25
Payer: COMMERCIAL

## 2024-09-25 VITALS
HEIGHT: 68 IN | SYSTOLIC BLOOD PRESSURE: 118 MMHG | WEIGHT: 177.5 LBS | HEART RATE: 82 BPM | DIASTOLIC BLOOD PRESSURE: 80 MMHG | BODY MASS INDEX: 26.9 KG/M2

## 2024-09-25 DIAGNOSIS — G43.719 INTRACTABLE CHRONIC MIGRAINE WITHOUT AURA AND WITHOUT STATUS MIGRAINOSUS: Primary | ICD-10-CM

## 2024-09-25 PROBLEM — K35.30 ACUTE APPENDICITIS WITH LOCALIZED PERITONITIS, WITHOUT PERFORATION, ABSCESS, OR GANGRENE: Status: RESOLVED | Noted: 2023-12-22 | Resolved: 2024-09-25

## 2024-09-25 NOTE — PROCEDURES
Procedures  [facility]  [facility]  [address] [facility] [address] [place] [address]  Phone:[phone]  Fax: [phone]  Botox Visit, #10    Chief Complaint   Patient presents with    Botulinum Toxin Injection         A/P:        ICD-10-CM ICD-9-CM   1. Intractable chronic migraine without aura and without status migrainosus  G43.719 346.71     Botox for migraine    Historically: Patient meets the criteria for chronic migraine. In summary, He has headaches/migraines >15 days per month  and last >4 hours if untreated. Specifics of duration, frequency and strength are listed in office visit HPI's.  This pattern has continued for >3 months.  He has failed at least three preventive medications (full list of medications is listed in office notes of Therapies tried in past)  I have therefore recommended a trial of Botox via the PREEMPT protocol for migraine prophylaxis.We schedule regular follow up intervals to check on status.     PROCEDURE NOTE:  BOTOX injection is indicated for the prophylaxis of headaches in adult patients with chronic  migraine. Patient meets indications for BOTOX therapy.  Potential risks and benefits were reviewed. Side effects including, but not limited to, potential  systemic allergic reactions of the anaphylactic type as well as local injection site reactions of  blepharoptosis, diplopia, infection, bleeding, pain, redness and bruising were reviewed. The  potential for headaches and/or neck pain post procedure were reviewed.  The patient's questions were answered. The patient signed a consent form. Patient  understands that depending on their insurance carrier, there may be a copay for this treatment.  BOTOX was reconstituted using  two 100 unit vials and diluted with 4 mL of sterile saline.  BOTOX was injected as per the PREEMPT trial injection paradigm with dose administered as 5  unit intramuscular (IM) injections per site using a sterile, 30-gauge 0.5 inch  needle as follows:  Muscle Dose, # of Sites   10 units divided in 2 sites  Procerus 5 units in 1 site  Frontalis 20 units divided in 4 sites  Temporalis 40 units divided in 8 sites  Occipitalis 30 units divided in 6 sites  Cervical paraspinal 20 units divided in 4 sites  Trapezius 30 units divided in 6 sites  Each site was cleaned with alcohol prior to injection. A total dose of 155 units were injected. 45  units were discarded/wasted.      The patient tolerated the procedure well with no immediate complications.  MEDICATION INFO:  NDC 9928-7977-13   Lot # [id number]  Exp 10/2026      As aside:  >70% relief from the injections     Presented to appt after appt finished. Able to work him later.        Follow up in 3 months for repeat injection, we also have interspersed office visits scheduled to ensure efficacy of botox sessions/check on patient.       Syed Stevens MPA, PA-C  Attending available-Dr Willie MD           Personal Protective Equipment:    Personal Protective Equipment was used during this encounter including;   non latex gloves.     09/25/2024      CC: Jerry Navarro MD

## 2024-10-11 ENCOUNTER — TELEPHONE (OUTPATIENT)
Dept: NEUROLOGY | Facility: CLINIC | Age: 34
End: 2024-10-11
Payer: COMMERCIAL

## 2024-10-11 NOTE — TELEPHONE ENCOUNTER
----- Message from Yaneth sent at 10/10/2024 12:15 PM CDT -----  Contact: pt [phone]  Type: Needs Medical Advice  Who Called:  Pt     Best Call Back Number: [phone]    Additional Information: Pt requesting lab orders be sent to [facility] @ [address], [place] [address]. Pt trying to get labs done today.

## 2024-10-14 ENCOUNTER — TELEPHONE (OUTPATIENT)
Dept: NEUROLOGY | Facility: CLINIC | Age: 34
End: 2024-10-14
Payer: COMMERCIAL

## 2024-10-14 ENCOUNTER — PATIENT MESSAGE (OUTPATIENT)
Dept: NEUROLOGY | Facility: CLINIC | Age: 34
End: 2024-10-14
Payer: COMMERCIAL

## 2024-10-14 NOTE — TELEPHONE ENCOUNTER
----- Message from Alessio sent at 10/14/2024 11:55 AM CDT -----  Type: Needs Medical Advice    Who Called:  Pt    Best Call Back Number: [phone]    Additional Information: Pt is currently at [employer] trying to get his lab orders fax from [facility] office so he can get lab work done today. Please call back to advise, Thanks!

## 2024-12-18 ENCOUNTER — PROCEDURE VISIT (OUTPATIENT)
Dept: NEUROLOGY | Facility: CLINIC | Age: 34
End: 2024-12-18
Payer: COMMERCIAL

## 2024-12-18 VITALS
BODY MASS INDEX: 27.04 KG/M2 | SYSTOLIC BLOOD PRESSURE: 127 MMHG | RESPIRATION RATE: 17 BRPM | HEIGHT: 68 IN | DIASTOLIC BLOOD PRESSURE: 86 MMHG | HEART RATE: 97 BPM | WEIGHT: 178.44 LBS

## 2024-12-18 DIAGNOSIS — R51.9 WORSENING HEADACHES: ICD-10-CM

## 2024-12-18 DIAGNOSIS — G43.719 INTRACTABLE CHRONIC MIGRAINE WITHOUT AURA AND WITHOUT STATUS MIGRAINOSUS: ICD-10-CM

## 2024-12-18 RX ORDER — ATOGEPANT 30 MG/1
30 TABLET ORAL DAILY
Qty: 30 TABLET | Refills: 6 | Status: SHIPPED | OUTPATIENT
Start: 2024-12-18

## 2024-12-18 NOTE — PROCEDURES
Procedures  [facility]  [facility]ache [facility]  [address] [facility] [place] [address]  Phone:[phone]  Fax: [phone]  Botox Visit, #11    Chief Complaint   Patient presents with    Botulinum Toxin Injection         A/P:        ICD-10-CM ICD-9-CM   1. Intractable chronic migraine without aura and without status migrainosus  G43.719 346.71   2. Worsening headaches  R51.9 784.0     Botox for migraine    Historically: Patient meets the criteria for chronic migraine. In summary, He has headaches/migraines >15 days per month  and last >4 hours if untreated. Specifics of duration, frequency and strength are listed in office visit HPI's.  This pattern has continued for >3 months.  He has failed at least three preventive medications (full list of medications is listed in office notes of Therapies tried in past)  I have therefore recommended a trial of Botox via the PREEMPT protocol for migraine prophylaxis.We schedule regular follow up intervals to check on status.     PROCEDURE NOTE:  BOTOX injection is indicated for the prophylaxis of headaches in adult patients with chronic  migraine. Patient meets indications for BOTOX therapy.  Potential risks and benefits were reviewed. Side effects including, but not limited to, potential  systemic allergic reactions of the anaphylactic type as well as local injection site reactions of  blepharoptosis, diplopia, infection, bleeding, pain, redness and bruising were reviewed. The  potential for headaches and/or neck pain post procedure were reviewed.  The patient's questions were answered. The patient signed a consent form. Patient  understands that depending on their insurance carrier, there may be a copay for this treatment.  BOTOX was reconstituted using  two 100 unit vials and diluted with 4 mL of sterile saline.  BOTOX was injected as per the PREEMPT trial injection paradigm with dose administered as 5  unit intramuscular (IM) injections per  site using a sterile, 30-gauge 0.5 inch needle as follows:  Muscle Dose, # of Sites   10 units divided in 2 sites  Procerus 5 units in 1 site  Frontalis 20 units divided in 4 sites  Temporalis 40 units divided in 8 sites  Occipitalis 30 units divided in 6 sites  Cervical paraspinal 20 units divided in 4 sites  Trapezius 30 units divided in 6 sites  Each site was cleaned with alcohol prior to injection. A total dose of 155 units were injected. 45  units were discarded/wasted.      The patient tolerated the procedure well with no immediate complications.  MEDICATION INFO:  NDC 4727-7450-13   Lot # [id number]  Exp 12/2026      As aside:  >70% relief from the injections     Presented to appt after appt finished. Able to work him later (again).     States qulipta at 60 mg causing constipation and fatigue (?) ---states takes at night. However, with botox, has really helped his HA. Discussed going down to 30 mg and letting me know how he is fairing. He had other medication questions, discussed we can certainly get him an office visit to further discuss. He voiced agreement and will let me know if that is needed/give me an update about the medicine.        Follow up in 3 months for repeat injection, we also have interspersed office visits scheduled to ensure efficacy of botox sessions/check on patient.       Syed Stevens MPA, PA-C  Attending available-Dr Willie MD           Personal Protective Equipment:    Personal Protective Equipment was used during this encounter including;   non latex gloves.     12/18/2024      CC: Jerry Navarro MD

## 2025-02-17 ENCOUNTER — TELEPHONE (OUTPATIENT)
Dept: NEUROLOGY | Facility: CLINIC | Age: 35
End: 2025-02-17
Payer: COMMERCIAL

## 2025-02-17 ENCOUNTER — PATIENT MESSAGE (OUTPATIENT)
Dept: NEUROLOGY | Facility: CLINIC | Age: 35
End: 2025-02-17
Payer: COMMERCIAL

## 2025-02-17 DIAGNOSIS — G43.719 INTRACTABLE CHRONIC MIGRAINE WITHOUT AURA AND WITHOUT STATUS MIGRAINOSUS: ICD-10-CM

## 2025-02-17 NOTE — TELEPHONE ENCOUNTER
----- Message from Mulugeta sent at 2/17/2025  9:34 AM CST -----  Regarding: refill  Contact: patient  Type:  RX Refill RequestWho Called: patientRefill or New Rx:refill             atogepant (QULIPTA) 30 [facility] [address] [address]Rtqrm: [phone] Fax: [phone] or Mail Order:localOrdering Provider:Seth the patient rather a call back or a response via [facility]? Best Call Back Number:[phone] Information: new pharmacy/ new order

## 2025-02-18 DIAGNOSIS — G43.719 INTRACTABLE CHRONIC MIGRAINE WITHOUT AURA AND WITHOUT STATUS MIGRAINOSUS: ICD-10-CM

## 2025-02-18 RX ORDER — ATOGEPANT 30 MG/1
30 TABLET ORAL DAILY
Qty: 90 TABLET | Refills: 1 | Status: SHIPPED | OUTPATIENT
Start: 2025-02-18 | End: 2025-02-18 | Stop reason: SDUPTHER

## 2025-02-18 RX ORDER — ATOGEPANT 30 MG/1
30 TABLET ORAL DAILY
Qty: 30 TABLET | Refills: 6 | Status: SHIPPED | OUTPATIENT
Start: 2025-02-18 | End: 2025-02-18 | Stop reason: SDUPTHER

## 2025-02-19 RX ORDER — ATOGEPANT 30 MG/1
30 TABLET ORAL DAILY
Qty: 90 TABLET | Refills: 1 | Status: SHIPPED | OUTPATIENT
Start: 2025-02-19 | End: 2025-02-20

## 2025-02-20 ENCOUNTER — TELEPHONE (OUTPATIENT)
Dept: NEUROLOGY | Facility: CLINIC | Age: 35
End: 2025-02-20
Payer: COMMERCIAL

## 2025-02-20 RX ORDER — ATOGEPANT 30 MG/1
TABLET ORAL
Qty: 90 TABLET | Refills: 1 | Status: SHIPPED | OUTPATIENT
Start: 2025-02-20 | End: 2025-02-21 | Stop reason: SDUPTHER

## 2025-02-21 RX ORDER — ATOGEPANT 30 MG/1
TABLET ORAL
Qty: 30 TABLET | Refills: 6 | Status: SHIPPED | OUTPATIENT
Start: 2025-02-21

## 2025-03-11 ENCOUNTER — TELEPHONE (OUTPATIENT)
Dept: NEUROLOGY | Facility: CLINIC | Age: 35
End: 2025-03-11
Payer: COMMERCIAL

## 2025-03-11 NOTE — TELEPHONE ENCOUNTER
Per Pre-Service: Dr. Stevens,   Mr Raymond, needs to upload new insurance card into [facility] so I can obtain correct phone number to verify & obtain authorization.   Thanks,   Jeannette     Pt notified via portal.

## 2025-03-19 ENCOUNTER — PROCEDURE VISIT (OUTPATIENT)
Dept: NEUROLOGY | Facility: CLINIC | Age: 35
End: 2025-03-19
Payer: COMMERCIAL

## 2025-03-19 VITALS — RESPIRATION RATE: 17 BRPM | SYSTOLIC BLOOD PRESSURE: 128 MMHG | DIASTOLIC BLOOD PRESSURE: 87 MMHG | HEART RATE: 81 BPM

## 2025-03-19 DIAGNOSIS — G43.709 CHRONIC MIGRAINE W/O AURA W/O STATUS MIGRAINOSUS, NOT INTRACTABLE: Primary | ICD-10-CM

## 2025-03-19 NOTE — PROCEDURES
Procedures  [facility]  [facility]ache Clinic  [address] [facility] [address] [place] [address]  Phone:[phone]  Fax: [phone]  Botox Visit, #12    Chief Complaint   Patient presents with    Botulinum Toxin Injection         A/P:        ICD-10-CM ICD-9-CM   1. Chronic migraine w/o aura w/o status migrainosus, not intractable  G43.709 346.70     Botox for migraine    Historically: Patient meets the criteria for chronic migraine. In summary, He has headaches/migraines >15 days per month  and last >4 hours if untreated. Specifics of duration, frequency and strength are listed in office visit HPI's.  This pattern has continued for >3 months.  He has failed at least three preventive medications (full list of medications is listed in office notes of Therapies tried in past)  I have therefore recommended a trial of Botox via the PREEMPT protocol for migraine prophylaxis.We schedule regular follow up intervals to check on status.     PROCEDURE NOTE:  BOTOX injection is indicated for the prophylaxis of headaches in adult patients with chronic  migraine. Patient meets indications for BOTOX therapy.  Potential risks and benefits were reviewed. Side effects including, but not limited to, potential  systemic allergic reactions of the anaphylactic type as well as local injection site reactions of  blepharoptosis, diplopia, infection, bleeding, pain, redness and bruising were reviewed. The  potential for headaches and/or neck pain post procedure were reviewed.  The patient's questions were answered. The patient signed a consent form. Patient  understands that depending on their insurance carrier, there may be a copay for this treatment.  BOTOX was reconstituted using  two 100 unit vials and diluted with 4 mL of sterile saline.  BOTOX was injected as per the PREEMPT trial injection paradigm with dose administered as 5  unit intramuscular (IM) injections per site using a sterile, 30-gauge 0.5 inch needle  as follows:  Muscle Dose, # of Sites   10 units divided in 2 sites  Procerus 5 units in 1 site  Frontalis 20 units divided in 4 sites  Temporalis 40 units divided in 8 sites  Occipitalis 30 units divided in 6 sites  Cervical paraspinal 20 units divided in 4 sites  Trapezius 30 units divided in 6 sites  Each site was cleaned with alcohol prior to injection. A total dose of 155 units were injected. 45  units were discarded/wasted.      The patient tolerated the procedure well with no immediate complications.  MEDICATION INFO:  NDC 6189-5763-98   Lot #  [id number]  Exp 5/2027      As aside:  >70% relief from the injections     Presented to appt after appt finished. Able to work him later (again). Discussed I may not be able to see him if he continues to present after his appts.          Follow up in 3 months for repeat injection, we also have interspersed office visits scheduled to ensure efficacy of botox sessions/check on patient.       Syed Stevens MPA, PA-C  Attending available-Dr Willie MD           Personal Protective Equipment:    Personal Protective Equipment was used during this encounter including;   non latex gloves.     03/19/2025      CC: Jerry Navarro MD

## 2025-06-19 ENCOUNTER — TELEPHONE (OUTPATIENT)
Dept: NEUROLOGY | Facility: CLINIC | Age: 35
End: 2025-06-19
Payer: COMMERCIAL

## 2025-06-19 NOTE — TELEPHONE ENCOUNTER
Copied from CRM #[id number]. Topic: Appointments - Appointment Access  >> Jun 19, 2025  9:16 AM Yanet wrote:  Type:  JOSIAH  Apoointment Request    Name of Caller:PT   When is the first available appointment?PT FOR 9:45A TODAY  Symptoms:BOTOX   Would the patient rather a call back or a response via [facility]? CALL   Best Call Back Number:[phone]  Additional Information: PT CAN COME IN LATER TODAY AFTER 10A, IF AN APPT IS AVAILABLE   PLEASE CALL TO CONFIRM   THANK YOU

## 2025-06-19 NOTE — TELEPHONE ENCOUNTER
Copied from CRM #[id number]. Topic: Appointments - Appointment Rescheduling  >> Jun 19, 2025  9:15 AM Yanet wrote:  BOTOX - MESSAGE SENT

## (undated) DEVICE — MANIFOLD 4 PORT

## (undated) DEVICE — STAPLER INT LINEAR ARTC 3.5-45

## (undated) DEVICE — CONTAINER MULTIPURPOSE/SPECIME

## (undated) DEVICE — TROCAR ENDOPATH XCEL 5MM 7.5CM

## (undated) DEVICE — SUPPORT ULNA NERVE PROTECTOR

## (undated) DEVICE — SEALER LIGASURE LAP 37CM 5MM

## (undated) DEVICE — DISSECTOR EPIX LAPA 5MMX35CM

## (undated) DEVICE — GLOVE SURGICAL LATEX SZ 8

## (undated) DEVICE — Device

## (undated) DEVICE — TROCAR ENDOPATH XCEL 12X100MM

## (undated) DEVICE — SUT 0 VICRYL / UR6 (J603)

## (undated) DEVICE — ELECTRODE REM PLYHSV RETURN 9

## (undated) DEVICE — NDL HYPO REG 25G X 1 1/2

## (undated) DEVICE — SUT MONOCYRL 4-0 PS2 UND

## (undated) DEVICE — BAG TISSUE RETRIEVAL 225ML

## (undated) DEVICE — ADHESIVE DERMABOND ADVANCED